# Patient Record
Sex: FEMALE | Race: BLACK OR AFRICAN AMERICAN | NOT HISPANIC OR LATINO | URBAN - METROPOLITAN AREA
[De-identification: names, ages, dates, MRNs, and addresses within clinical notes are randomized per-mention and may not be internally consistent; named-entity substitution may affect disease eponyms.]

---

## 2017-08-20 ENCOUNTER — EMERGENCY (EMERGENCY)
Facility: HOSPITAL | Age: 42
LOS: 1 days | Discharge: HOME | End: 2017-08-20
Attending: EMERGENCY MEDICINE | Admitting: EMERGENCY MEDICINE
Payer: MEDICAID

## 2017-08-20 VITALS
DIASTOLIC BLOOD PRESSURE: 83 MMHG | OXYGEN SATURATION: 99 % | WEIGHT: 255.74 LBS | RESPIRATION RATE: 18 BRPM | HEART RATE: 76 BPM | TEMPERATURE: 98 F | SYSTOLIC BLOOD PRESSURE: 150 MMHG | HEIGHT: 66 IN

## 2017-08-20 VITALS
RESPIRATION RATE: 18 BRPM | SYSTOLIC BLOOD PRESSURE: 121 MMHG | TEMPERATURE: 98 F | OXYGEN SATURATION: 100 % | DIASTOLIC BLOOD PRESSURE: 76 MMHG | HEART RATE: 62 BPM

## 2017-08-20 LAB
ALBUMIN SERPL ELPH-MCNC: 4.4 G/DL — SIGNIFICANT CHANGE UP (ref 3.3–5)
ALP SERPL-CCNC: 64 U/L — SIGNIFICANT CHANGE UP (ref 40–120)
ALT FLD-CCNC: 24 U/L — SIGNIFICANT CHANGE UP (ref 10–45)
ANION GAP SERPL CALC-SCNC: 13 MMOL/L — SIGNIFICANT CHANGE UP (ref 5–17)
APTT BLD: 26.6 SEC — LOW (ref 27.5–37.4)
AST SERPL-CCNC: 27 U/L — SIGNIFICANT CHANGE UP (ref 10–40)
BASOPHILS NFR BLD AUTO: 0.1 % — SIGNIFICANT CHANGE UP (ref 0–2)
BILIRUB SERPL-MCNC: 1.1 MG/DL — SIGNIFICANT CHANGE UP (ref 0.2–1.2)
BUN SERPL-MCNC: 8 MG/DL — SIGNIFICANT CHANGE UP (ref 7–23)
CALCIUM SERPL-MCNC: 9.3 MG/DL — SIGNIFICANT CHANGE UP (ref 8.4–10.5)
CHLORIDE SERPL-SCNC: 101 MMOL/L — SIGNIFICANT CHANGE UP (ref 96–108)
CK MB CFR SERPL CALC: 4.1 NG/ML — SIGNIFICANT CHANGE UP (ref 0–6.7)
CK SERPL-CCNC: 685 U/L — HIGH (ref 25–170)
CO2 SERPL-SCNC: 26 MMOL/L — SIGNIFICANT CHANGE UP (ref 22–31)
CREAT SERPL-MCNC: 0.8 MG/DL — SIGNIFICANT CHANGE UP (ref 0.5–1.3)
EOSINOPHIL NFR BLD AUTO: 0.2 % — SIGNIFICANT CHANGE UP (ref 0–6)
GLUCOSE SERPL-MCNC: 113 MG/DL — HIGH (ref 70–99)
HCT VFR BLD CALC: 30.6 % — LOW (ref 34.5–45)
HGB BLD-MCNC: 9 G/DL — LOW (ref 11.5–15.5)
INR BLD: 1.1 — SIGNIFICANT CHANGE UP (ref 0.88–1.16)
LIDOCAIN IGE QN: 14 U/L — SIGNIFICANT CHANGE UP (ref 7–60)
LYMPHOCYTES # BLD AUTO: 19.8 % — SIGNIFICANT CHANGE UP (ref 13–44)
MCHC RBC-ENTMCNC: 20.4 PG — LOW (ref 27–34)
MCHC RBC-ENTMCNC: 29.4 G/DL — LOW (ref 32–36)
MCV RBC AUTO: 69.4 FL — LOW (ref 80–100)
MONOCYTES NFR BLD AUTO: 5.4 % — SIGNIFICANT CHANGE UP (ref 2–14)
NEUTROPHILS NFR BLD AUTO: 74.5 % — SIGNIFICANT CHANGE UP (ref 43–77)
PLATELET # BLD AUTO: 217 K/UL — SIGNIFICANT CHANGE UP (ref 150–400)
POTASSIUM SERPL-MCNC: 3.8 MMOL/L — SIGNIFICANT CHANGE UP (ref 3.5–5.3)
POTASSIUM SERPL-SCNC: 3.8 MMOL/L — SIGNIFICANT CHANGE UP (ref 3.5–5.3)
PROT SERPL-MCNC: 8.1 G/DL — SIGNIFICANT CHANGE UP (ref 6–8.3)
PROTHROM AB SERPL-ACNC: 12.2 SEC — SIGNIFICANT CHANGE UP (ref 9.8–12.7)
RBC # BLD: 4.41 M/UL — SIGNIFICANT CHANGE UP (ref 3.8–5.2)
RBC # FLD: 17.4 % — HIGH (ref 10.3–16.9)
SODIUM SERPL-SCNC: 140 MMOL/L — SIGNIFICANT CHANGE UP (ref 135–145)
TROPONIN T SERPL-MCNC: <0.01 NG/ML — SIGNIFICANT CHANGE UP (ref 0–0.01)
WBC # BLD: 9.2 K/UL — SIGNIFICANT CHANGE UP (ref 3.8–10.5)
WBC # FLD AUTO: 9.2 K/UL — SIGNIFICANT CHANGE UP (ref 3.8–10.5)

## 2017-08-20 PROCEDURE — 93005 ELECTROCARDIOGRAM TRACING: CPT

## 2017-08-20 PROCEDURE — 85730 THROMBOPLASTIN TIME PARTIAL: CPT

## 2017-08-20 PROCEDURE — 96374 THER/PROPH/DIAG INJ IV PUSH: CPT

## 2017-08-20 PROCEDURE — 80053 COMPREHEN METABOLIC PANEL: CPT

## 2017-08-20 PROCEDURE — 85610 PROTHROMBIN TIME: CPT

## 2017-08-20 PROCEDURE — 83690 ASSAY OF LIPASE: CPT

## 2017-08-20 PROCEDURE — 85025 COMPLETE CBC W/AUTO DIFF WBC: CPT

## 2017-08-20 PROCEDURE — 96375 TX/PRO/DX INJ NEW DRUG ADDON: CPT

## 2017-08-20 PROCEDURE — 93010 ELECTROCARDIOGRAM REPORT: CPT

## 2017-08-20 PROCEDURE — 71010: CPT | Mod: 26

## 2017-08-20 PROCEDURE — 71045 X-RAY EXAM CHEST 1 VIEW: CPT

## 2017-08-20 PROCEDURE — 84484 ASSAY OF TROPONIN QUANT: CPT

## 2017-08-20 PROCEDURE — 36415 COLL VENOUS BLD VENIPUNCTURE: CPT

## 2017-08-20 PROCEDURE — 82553 CREATINE MB FRACTION: CPT

## 2017-08-20 PROCEDURE — 82550 ASSAY OF CK (CPK): CPT

## 2017-08-20 PROCEDURE — 99284 EMERGENCY DEPT VISIT MOD MDM: CPT | Mod: 25

## 2017-08-20 PROCEDURE — 99285 EMERGENCY DEPT VISIT HI MDM: CPT | Mod: 25

## 2017-08-20 RX ORDER — FAMOTIDINE 10 MG/ML
20 INJECTION INTRAVENOUS ONCE
Qty: 0 | Refills: 0 | Status: COMPLETED | OUTPATIENT
Start: 2017-08-20 | End: 2017-08-20

## 2017-08-20 RX ORDER — ONDANSETRON 8 MG/1
4 TABLET, FILM COATED ORAL ONCE
Qty: 0 | Refills: 0 | Status: COMPLETED | OUTPATIENT
Start: 2017-08-20 | End: 2017-08-20

## 2017-08-20 RX ORDER — OMEPRAZOLE 10 MG/1
1 CAPSULE, DELAYED RELEASE ORAL
Qty: 7 | Refills: 0 | OUTPATIENT
Start: 2017-08-20 | End: 2017-08-27

## 2017-08-20 RX ADMIN — FAMOTIDINE 20 MILLIGRAM(S): 10 INJECTION INTRAVENOUS at 06:25

## 2017-08-20 RX ADMIN — ONDANSETRON 4 MILLIGRAM(S): 8 TABLET, FILM COATED ORAL at 06:25

## 2017-08-20 NOTE — ED ADULT NURSE NOTE - OBJECTIVE STATEMENT
Patient to the ED for mid sternal chest discomfort occurring on and off for the past couple months.  Denies pain, refer to issue as discomfort associated with nausea and vomiting beginning yesterday, reports taking pepto bismol times 4 with no relief.

## 2017-08-20 NOTE — ED ADULT TRIAGE NOTE - ARRIVAL INFO ADDITIONAL COMMENTS
pt c.o midsternal chest pain with intermittent nausea and vomiting for 2 days. denies any fever, chills, diarrhea, difficulty in breathing, sob. c.o intermittent palpitations. equal and b.l chest rises with unlabored breathing noted.

## 2017-08-20 NOTE — ED PROVIDER NOTE - MEDICAL DECISION MAKING DETAILS
41F with intermittent dull SS chest pain now with epigastric discomfort and vomits after eating sausage sandwich from [employer]. EKG no stemi. Will check labs including CE. Also suspect pt has gastritis/food poisoning which is causing epigastric discomfort and vomits. Will give IVFs, zofran, and pepcid. Dispo pending w/u. Consider DC if labwork and CXR unremarkable and if patient's sx improved. Pt advised that if DC'd I recommend prompt outpt PMD/Cards appt for outpt stress. 41F with intermittent dull SS chest pain now with epigastric discomfort and vomits after eating sausage sandwich from [employer]. EKG no stemi. Will check labs including CE. Also suspect pt has gastritis/food poisoning which is causing epigastric discomfort and vomits. Will give IVFs, zofran, and pepcid. Dispo pending w/u. Consider DC if labwork and CXR unremarkable and if patient's sx improved. Pt advised that if DC'd I recommend prompt outpt PMD/Cards appt for outpt stress.  Patient feeling better. Labs neg trop and mild elevated CPK.  Recommend f/u with medicine and cards

## 2017-08-20 NOTE — ED ADULT NURSE NOTE - CHPI ED SYMPTOMS NEG
no back pain/no fever/no chills/no diaphoresis/no shortness of breath/no dizziness/no cough/no chest pain/no syncope

## 2017-08-20 NOTE — ED PROVIDER NOTE - OBJECTIVE STATEMENT
41F with no sig PMHx who p/w substernal chest discomfort, dull, intermittent x months. No A/A factors, no radiation. She came to ER today because it has been associated with multiple episodes of NB vomits and epigastric abd discomfort after eating an egg and sausage sandwich from [employer] yesterday. No f/c, no diarrhea, no SOB. Pt did not take anything for her sx and currently does not have a pmd. No h/o CAd, no fhx of cad.

## 2017-08-20 NOTE — ED PROVIDER NOTE - PROGRESS NOTE DETAILS
Pt received from Dr. Arroyo at s/o; pt awaiting labs including cardiac enzymes, lipase, lft's. Pt to be re-assessed after tx. If feeling better and labs normal, pt to be dc'd to home as per night team. Will continue to monitor.

## 2017-08-20 NOTE — ED PROVIDER NOTE - PHYSICAL EXAMINATION
GEN: Well appearing, obese, awake, alert, oriented to person, place, time/situation and in no apparent distress.  ENT: Airway patent, Nasal mucosa clear. Mouth with normal mucosa.  EYES: Clear bilaterally.  RESPIRATORY: Breathing comfortably with normal RR.  CARDIAC: Regular rate and rhythm, no w/c/r.  ABDOMEN: Soft, nontender, +bowel sounds, no rebound, rigidity, or guarding.  MSK: Range of motion is not limited, no deformities noted.  NEURO: Alert and oriented, no focal deficits.  SKIN: Skin normal color for race, warm, dry and intact. No evidence of rash.  PSYCH: Alert and oriented to person, place, time/situation. normal mood and affect. no apparent risk to self or others.

## 2017-08-24 DIAGNOSIS — R07.89 OTHER CHEST PAIN: ICD-10-CM

## 2017-08-24 DIAGNOSIS — K29.70 GASTRITIS, UNSPECIFIED, WITHOUT BLEEDING: ICD-10-CM

## 2017-08-24 PROBLEM — Z00.00 ENCOUNTER FOR PREVENTIVE HEALTH EXAMINATION: Status: ACTIVE | Noted: 2017-08-24

## 2017-09-29 ENCOUNTER — APPOINTMENT (OUTPATIENT)
Dept: HEART AND VASCULAR | Facility: CLINIC | Age: 42
End: 2017-09-29
Payer: MEDICAID

## 2017-09-29 VITALS
HEART RATE: 76 BPM | SYSTOLIC BLOOD PRESSURE: 122 MMHG | TEMPERATURE: 98.7 F | BODY MASS INDEX: 42.47 KG/M2 | WEIGHT: 258 LBS | HEIGHT: 65.5 IN | OXYGEN SATURATION: 99 % | DIASTOLIC BLOOD PRESSURE: 62 MMHG

## 2017-09-29 DIAGNOSIS — R00.2 PALPITATIONS: ICD-10-CM

## 2017-09-29 DIAGNOSIS — Z78.9 OTHER SPECIFIED HEALTH STATUS: ICD-10-CM

## 2017-09-29 DIAGNOSIS — Z86.2 PERSONAL HISTORY OF DISEASES OF THE BLOOD AND BLOOD-FORMING ORGANS AND CERTAIN DISORDERS INVOLVING THE IMMUNE MECHANISM: ICD-10-CM

## 2017-09-29 DIAGNOSIS — Z87.891 PERSONAL HISTORY OF NICOTINE DEPENDENCE: ICD-10-CM

## 2017-09-29 DIAGNOSIS — R73.09 OTHER ABNORMAL GLUCOSE: ICD-10-CM

## 2017-09-29 PROCEDURE — 99203 OFFICE O/P NEW LOW 30 MIN: CPT | Mod: 25

## 2017-09-29 PROCEDURE — 93000 ELECTROCARDIOGRAM COMPLETE: CPT

## 2017-09-29 PROCEDURE — 36415 COLL VENOUS BLD VENIPUNCTURE: CPT

## 2017-10-02 LAB
FERRITIN SERPL-MCNC: 6 NG/ML
HBA1C MFR BLD HPLC: 5.8 %
IRON SATN MFR SERPL: 5 %
IRON SERPL-MCNC: 20 UG/DL
TIBC SERPL-MCNC: 414 UG/DL
UIBC SERPL-MCNC: 394 UG/DL

## 2017-10-09 ENCOUNTER — APPOINTMENT (OUTPATIENT)
Dept: HEART AND VASCULAR | Facility: CLINIC | Age: 42
End: 2017-10-09
Payer: MEDICAID

## 2017-10-09 PROCEDURE — ZZZZZ: CPT

## 2017-10-09 PROCEDURE — 93015 CV STRESS TEST SUPVJ I&R: CPT

## 2017-10-09 PROCEDURE — 93306 TTE W/DOPPLER COMPLETE: CPT

## 2018-03-12 ENCOUNTER — APPOINTMENT (OUTPATIENT)
Dept: PULMONOLOGY | Facility: CLINIC | Age: 43
End: 2018-03-12
Payer: MEDICAID

## 2018-03-12 PROCEDURE — 94727 GAS DIL/WSHOT DETER LNG VOL: CPT

## 2018-03-12 PROCEDURE — 94060 EVALUATION OF WHEEZING: CPT

## 2018-03-12 PROCEDURE — 94729 DIFFUSING CAPACITY: CPT

## 2018-05-11 VITALS
DIASTOLIC BLOOD PRESSURE: 86 MMHG | TEMPERATURE: 98 F | OXYGEN SATURATION: 100 % | WEIGHT: 225.09 LBS | HEART RATE: 61 BPM | RESPIRATION RATE: 18 BRPM | HEIGHT: 66 IN | SYSTOLIC BLOOD PRESSURE: 147 MMHG

## 2018-05-11 PROCEDURE — 93010 ELECTROCARDIOGRAM REPORT: CPT

## 2018-05-11 PROCEDURE — 99285 EMERGENCY DEPT VISIT HI MDM: CPT | Mod: 25

## 2018-05-11 PROCEDURE — 99053 MED SERV 10PM-8AM 24 HR FAC: CPT

## 2018-05-11 RX ORDER — KETOROLAC TROMETHAMINE 30 MG/ML
30 SYRINGE (ML) INJECTION ONCE
Qty: 0 | Refills: 0 | Status: DISCONTINUED | OUTPATIENT
Start: 2018-05-11 | End: 2018-05-11

## 2018-05-11 NOTE — ED ADULT NURSE NOTE - OBJECTIVE STATEMENT
Pt walked into ED with c/o of right knee swelling and 6/10 pain with walking. Pt states onset was a week ago that she noted right after she started working out again. She also states that after the swelling she was on a flight to [place] today. She denies taking blood thinners, hx of DVT, palpitations, calf pain, CP, SOB, cough, fever, chills, taking Birth control or smoking. Pt denies taking any pain medication for relief. She is walking with a steady gait. Denies fall or trauma to the knee with no deformity noted.

## 2018-05-11 NOTE — ED ADULT NURSE NOTE - CHPI ED SYMPTOMS NEG
no weakness/no bruising/no stiffness/no tingling/no fever/no numbness/no difficulty bearing weight/no deformity/no abrasion/no back pain

## 2018-05-11 NOTE — ED ADULT TRIAGE NOTE - CHIEF COMPLAINT QUOTE
Pt presents with c/o RLE 8/10 PN, SWELLING X 1 WEEK. Recent travel to [place] but sx antecedent to travel.

## 2018-05-12 ENCOUNTER — TRANSCRIPTION ENCOUNTER (OUTPATIENT)
Age: 43
End: 2018-05-12

## 2018-05-12 ENCOUNTER — INPATIENT (INPATIENT)
Facility: HOSPITAL | Age: 43
LOS: 0 days | Discharge: HOME | DRG: 558 | End: 2018-05-12
Attending: INTERNAL MEDICINE | Admitting: INTERNAL MEDICINE
Payer: SELF-PAY

## 2018-05-12 VITALS
SYSTOLIC BLOOD PRESSURE: 128 MMHG | HEART RATE: 59 BPM | OXYGEN SATURATION: 100 % | DIASTOLIC BLOOD PRESSURE: 72 MMHG | TEMPERATURE: 98 F | RESPIRATION RATE: 18 BRPM

## 2018-05-12 DIAGNOSIS — D64.9 ANEMIA, UNSPECIFIED: ICD-10-CM

## 2018-05-12 DIAGNOSIS — Z29.9 ENCOUNTER FOR PROPHYLACTIC MEASURES, UNSPECIFIED: ICD-10-CM

## 2018-05-12 DIAGNOSIS — R63.8 OTHER SYMPTOMS AND SIGNS CONCERNING FOOD AND FLUID INTAKE: ICD-10-CM

## 2018-05-12 DIAGNOSIS — M62.82 RHABDOMYOLYSIS: ICD-10-CM

## 2018-05-12 LAB
ALBUMIN SERPL ELPH-MCNC: 3.4 G/DL — SIGNIFICANT CHANGE UP (ref 3.3–5)
ALBUMIN SERPL ELPH-MCNC: 3.6 G/DL — SIGNIFICANT CHANGE UP (ref 3.3–5)
ALBUMIN SERPL ELPH-MCNC: 4.2 G/DL — SIGNIFICANT CHANGE UP (ref 3.3–5)
ALP SERPL-CCNC: 53 U/L — SIGNIFICANT CHANGE UP (ref 40–120)
ALP SERPL-CCNC: 60 U/L — SIGNIFICANT CHANGE UP (ref 40–120)
ALP SERPL-CCNC: 61 U/L — SIGNIFICANT CHANGE UP (ref 40–120)
ALT FLD-CCNC: 38 U/L — SIGNIFICANT CHANGE UP (ref 10–45)
ALT FLD-CCNC: 41 U/L — SIGNIFICANT CHANGE UP (ref 10–45)
ALT FLD-CCNC: 47 U/L — HIGH (ref 10–45)
ANION GAP SERPL CALC-SCNC: 11 MMOL/L — SIGNIFICANT CHANGE UP (ref 5–17)
ANION GAP SERPL CALC-SCNC: 11 MMOL/L — SIGNIFICANT CHANGE UP (ref 5–17)
ANION GAP SERPL CALC-SCNC: 12 MMOL/L — SIGNIFICANT CHANGE UP (ref 5–17)
AST SERPL-CCNC: 63 U/L — HIGH (ref 10–40)
AST SERPL-CCNC: 65 U/L — HIGH (ref 10–40)
AST SERPL-CCNC: 95 U/L — HIGH (ref 10–40)
BASOPHILS NFR BLD AUTO: 0.3 % — SIGNIFICANT CHANGE UP (ref 0–2)
BILIRUB SERPL-MCNC: 0.5 MG/DL — SIGNIFICANT CHANGE UP (ref 0.2–1.2)
BILIRUB SERPL-MCNC: 0.6 MG/DL — SIGNIFICANT CHANGE UP (ref 0.2–1.2)
BILIRUB SERPL-MCNC: 0.7 MG/DL — SIGNIFICANT CHANGE UP (ref 0.2–1.2)
BLD GP AB SCN SERPL QL: NEGATIVE — SIGNIFICANT CHANGE UP
BUN SERPL-MCNC: 10 MG/DL — SIGNIFICANT CHANGE UP (ref 7–23)
BUN SERPL-MCNC: 13 MG/DL — SIGNIFICANT CHANGE UP (ref 7–23)
BUN SERPL-MCNC: 9 MG/DL — SIGNIFICANT CHANGE UP (ref 7–23)
CALCIUM SERPL-MCNC: 7.7 MG/DL — LOW (ref 8.4–10.5)
CALCIUM SERPL-MCNC: 8.2 MG/DL — LOW (ref 8.4–10.5)
CALCIUM SERPL-MCNC: 8.7 MG/DL — SIGNIFICANT CHANGE UP (ref 8.4–10.5)
CHLORIDE SERPL-SCNC: 101 MMOL/L — SIGNIFICANT CHANGE UP (ref 96–108)
CHLORIDE SERPL-SCNC: 104 MMOL/L — SIGNIFICANT CHANGE UP (ref 96–108)
CHLORIDE SERPL-SCNC: 106 MMOL/L — SIGNIFICANT CHANGE UP (ref 96–108)
CK SERPL-CCNC: 4264 U/L — HIGH (ref 25–170)
CK SERPL-CCNC: 4322 U/L — HIGH (ref 25–170)
CK SERPL-CCNC: 5702 U/L — HIGH (ref 25–170)
CO2 SERPL-SCNC: 22 MMOL/L — SIGNIFICANT CHANGE UP (ref 22–31)
CO2 SERPL-SCNC: 24 MMOL/L — SIGNIFICANT CHANGE UP (ref 22–31)
CO2 SERPL-SCNC: 27 MMOL/L — SIGNIFICANT CHANGE UP (ref 22–31)
CREAT SERPL-MCNC: 0.75 MG/DL — SIGNIFICANT CHANGE UP (ref 0.5–1.3)
CREAT SERPL-MCNC: 0.75 MG/DL — SIGNIFICANT CHANGE UP (ref 0.5–1.3)
CREAT SERPL-MCNC: 0.89 MG/DL — SIGNIFICANT CHANGE UP (ref 0.5–1.3)
EOSINOPHIL NFR BLD AUTO: 2.2 % — SIGNIFICANT CHANGE UP (ref 0–6)
FERRITIN SERPL-MCNC: 5 NG/ML — LOW (ref 15–150)
GLUCOSE SERPL-MCNC: 95 MG/DL — SIGNIFICANT CHANGE UP (ref 70–99)
GLUCOSE SERPL-MCNC: 97 MG/DL — SIGNIFICANT CHANGE UP (ref 70–99)
GLUCOSE SERPL-MCNC: 98 MG/DL — SIGNIFICANT CHANGE UP (ref 70–99)
HCT VFR BLD CALC: 29.8 % — LOW (ref 34.5–45)
HCT VFR BLD CALC: 32.9 % — LOW (ref 34.5–45)
HGB BLD-MCNC: 8.7 G/DL — LOW (ref 11.5–15.5)
HGB BLD-MCNC: 9.6 G/DL — LOW (ref 11.5–15.5)
IRON SATN MFR SERPL: 33 UG/DL — SIGNIFICANT CHANGE UP (ref 30–160)
IRON SATN MFR SERPL: 9 % — LOW (ref 14–50)
LYMPHOCYTES # BLD AUTO: 26.4 % — SIGNIFICANT CHANGE UP (ref 13–44)
MCHC RBC-ENTMCNC: 21.4 PG — LOW (ref 27–34)
MCHC RBC-ENTMCNC: 21.6 PG — LOW (ref 27–34)
MCHC RBC-ENTMCNC: 29.2 G/DL — LOW (ref 32–36)
MCHC RBC-ENTMCNC: 29.2 G/DL — LOW (ref 32–36)
MCV RBC AUTO: 73.4 FL — LOW (ref 80–100)
MCV RBC AUTO: 74.1 FL — LOW (ref 80–100)
MONOCYTES NFR BLD AUTO: 5.8 % — SIGNIFICANT CHANGE UP (ref 2–14)
NEUTROPHILS NFR BLD AUTO: 65.3 % — SIGNIFICANT CHANGE UP (ref 43–77)
PLATELET # BLD AUTO: 173 K/UL — SIGNIFICANT CHANGE UP (ref 150–400)
PLATELET # BLD AUTO: 182 K/UL — SIGNIFICANT CHANGE UP (ref 150–400)
POTASSIUM SERPL-MCNC: 3.6 MMOL/L — SIGNIFICANT CHANGE UP (ref 3.5–5.3)
POTASSIUM SERPL-MCNC: 4.1 MMOL/L — SIGNIFICANT CHANGE UP (ref 3.5–5.3)
POTASSIUM SERPL-MCNC: 4.7 MMOL/L — SIGNIFICANT CHANGE UP (ref 3.5–5.3)
POTASSIUM SERPL-SCNC: 3.6 MMOL/L — SIGNIFICANT CHANGE UP (ref 3.5–5.3)
POTASSIUM SERPL-SCNC: 4.1 MMOL/L — SIGNIFICANT CHANGE UP (ref 3.5–5.3)
POTASSIUM SERPL-SCNC: 4.7 MMOL/L — SIGNIFICANT CHANGE UP (ref 3.5–5.3)
PROT SERPL-MCNC: 6.3 G/DL — SIGNIFICANT CHANGE UP (ref 6–8.3)
PROT SERPL-MCNC: 6.4 G/DL — SIGNIFICANT CHANGE UP (ref 6–8.3)
PROT SERPL-MCNC: 7.7 G/DL — SIGNIFICANT CHANGE UP (ref 6–8.3)
RBC # BLD: 4.06 M/UL — SIGNIFICANT CHANGE UP (ref 3.8–5.2)
RBC # BLD: 4.44 M/UL — SIGNIFICANT CHANGE UP (ref 3.8–5.2)
RBC # FLD: 19.6 % — HIGH (ref 10.3–16.9)
RBC # FLD: 19.8 % — HIGH (ref 10.3–16.9)
RH IG SCN BLD-IMP: POSITIVE — SIGNIFICANT CHANGE UP
SODIUM SERPL-SCNC: 138 MMOL/L — SIGNIFICANT CHANGE UP (ref 135–145)
SODIUM SERPL-SCNC: 139 MMOL/L — SIGNIFICANT CHANGE UP (ref 135–145)
SODIUM SERPL-SCNC: 141 MMOL/L — SIGNIFICANT CHANGE UP (ref 135–145)
TIBC SERPL-MCNC: 355 UG/DL — SIGNIFICANT CHANGE UP (ref 220–430)
TRANSFERRIN SERPL-MCNC: 290 MG/DL — SIGNIFICANT CHANGE UP (ref 200–360)
UIBC SERPL-MCNC: 322 UG/DL — SIGNIFICANT CHANGE UP (ref 110–370)
WBC # BLD: 10.7 K/UL — HIGH (ref 3.8–10.5)
WBC # BLD: 6.7 K/UL — SIGNIFICANT CHANGE UP (ref 3.8–10.5)
WBC # FLD AUTO: 10.7 K/UL — HIGH (ref 3.8–10.5)
WBC # FLD AUTO: 6.7 K/UL — SIGNIFICANT CHANGE UP (ref 3.8–10.5)

## 2018-05-12 PROCEDURE — 93971 EXTREMITY STUDY: CPT

## 2018-05-12 PROCEDURE — 84702 CHORIONIC GONADOTROPIN TEST: CPT

## 2018-05-12 PROCEDURE — 83550 IRON BINDING TEST: CPT

## 2018-05-12 PROCEDURE — 93005 ELECTROCARDIOGRAM TRACING: CPT

## 2018-05-12 PROCEDURE — 82550 ASSAY OF CK (CPK): CPT

## 2018-05-12 PROCEDURE — 36415 COLL VENOUS BLD VENIPUNCTURE: CPT

## 2018-05-12 PROCEDURE — 85027 COMPLETE CBC AUTOMATED: CPT

## 2018-05-12 PROCEDURE — 93971 EXTREMITY STUDY: CPT | Mod: 26,RT

## 2018-05-12 PROCEDURE — 86900 BLOOD TYPING SEROLOGIC ABO: CPT

## 2018-05-12 PROCEDURE — 85025 COMPLETE CBC W/AUTO DIFF WBC: CPT

## 2018-05-12 PROCEDURE — 80053 COMPREHEN METABOLIC PANEL: CPT

## 2018-05-12 PROCEDURE — 86850 RBC ANTIBODY SCREEN: CPT

## 2018-05-12 PROCEDURE — 84466 ASSAY OF TRANSFERRIN: CPT

## 2018-05-12 PROCEDURE — 82728 ASSAY OF FERRITIN: CPT

## 2018-05-12 PROCEDURE — 99285 EMERGENCY DEPT VISIT HI MDM: CPT | Mod: 25

## 2018-05-12 PROCEDURE — 96374 THER/PROPH/DIAG INJ IV PUSH: CPT

## 2018-05-12 PROCEDURE — 86901 BLOOD TYPING SEROLOGIC RH(D): CPT

## 2018-05-12 RX ORDER — SODIUM CHLORIDE 9 MG/ML
1000 INJECTION INTRAMUSCULAR; INTRAVENOUS; SUBCUTANEOUS ONCE
Qty: 0 | Refills: 0 | Status: COMPLETED | OUTPATIENT
Start: 2018-05-12 | End: 2018-05-12

## 2018-05-12 RX ORDER — ASCORBIC ACID 60 MG
250 TABLET,CHEWABLE ORAL DAILY
Qty: 0 | Refills: 0 | Status: DISCONTINUED | OUTPATIENT
Start: 2018-05-12 | End: 2018-05-12

## 2018-05-12 RX ORDER — HEPARIN SODIUM 5000 [USP'U]/ML
7500 INJECTION INTRAVENOUS; SUBCUTANEOUS EVERY 8 HOURS
Qty: 0 | Refills: 0 | Status: DISCONTINUED | OUTPATIENT
Start: 2018-05-12 | End: 2018-05-12

## 2018-05-12 RX ORDER — ASCORBIC ACID 60 MG
1 TABLET,CHEWABLE ORAL
Qty: 30 | Refills: 0 | OUTPATIENT
Start: 2018-05-12 | End: 2018-06-10

## 2018-05-12 RX ORDER — FERROUS SULFATE 325(65) MG
1 TABLET ORAL
Qty: 0 | Refills: 0 | COMMUNITY

## 2018-05-12 RX ORDER — FERROUS SULFATE 325(65) MG
325 TABLET ORAL
Qty: 0 | Refills: 0 | Status: DISCONTINUED | OUTPATIENT
Start: 2018-05-12 | End: 2018-05-12

## 2018-05-12 RX ORDER — HEPARIN SODIUM 5000 [USP'U]/ML
5000 INJECTION INTRAVENOUS; SUBCUTANEOUS EVERY 8 HOURS
Qty: 0 | Refills: 0 | Status: DISCONTINUED | OUTPATIENT
Start: 2018-05-12 | End: 2018-05-12

## 2018-05-12 RX ORDER — ACETAMINOPHEN 500 MG
650 TABLET ORAL EVERY 6 HOURS
Qty: 0 | Refills: 0 | Status: DISCONTINUED | OUTPATIENT
Start: 2018-05-12 | End: 2018-05-12

## 2018-05-12 RX ORDER — SODIUM CHLORIDE 9 MG/ML
1000 INJECTION INTRAMUSCULAR; INTRAVENOUS; SUBCUTANEOUS
Qty: 0 | Refills: 0 | Status: DISCONTINUED | OUTPATIENT
Start: 2018-05-12 | End: 2018-05-12

## 2018-05-12 RX ADMIN — SODIUM CHLORIDE 1000 MILLILITER(S): 9 INJECTION INTRAMUSCULAR; INTRAVENOUS; SUBCUTANEOUS at 01:18

## 2018-05-12 RX ADMIN — Medication 30 MILLIGRAM(S): at 06:57

## 2018-05-12 RX ADMIN — Medication 30 MILLIGRAM(S): at 01:49

## 2018-05-12 RX ADMIN — SODIUM CHLORIDE 250 MILLILITER(S): 9 INJECTION INTRAMUSCULAR; INTRAVENOUS; SUBCUTANEOUS at 06:57

## 2018-05-12 RX ADMIN — SODIUM CHLORIDE 1000 MILLILITER(S): 9 INJECTION INTRAMUSCULAR; INTRAVENOUS; SUBCUTANEOUS at 03:07

## 2018-05-12 RX ADMIN — Medication 325 MILLIGRAM(S): at 18:24

## 2018-05-12 NOTE — DISCHARGE NOTE ADULT - PATIENT PORTAL LINK FT
You can access the [facility], offered by [facility], by registering with the following website: [email or web address]

## 2018-05-12 NOTE — ED PROVIDER NOTE - OBJECTIVE STATEMENT
42F no PMH c/o RLE pain. pt states pain ongoing for a few days.  +swelling to R thigh, pain to R distal thigh.  no fevers.  states recently started working out.  no calf pain. no rash.  states travelled back and forth from [place] today.

## 2018-05-12 NOTE — DISCHARGE NOTE ADULT - CARE PLAN
Principal Discharge DX:	Non-traumatic rhabdomyolysis  Goal:	Continue Adequate Fluid Hydration  Assessment and plan of treatment:	You were admitted for Rhabdomyolysis, which is likely related to Initiation of Exercise which Caused Muscle Breakdown, and Discomfort and Swelling in Right Leg. This was Diagnosed by an Elevated CK Level in your Blood. You were given IV Fluids and the CK Trended Down Appropriately and to a Safe Level. You are stable for Discharge Home. Please Continue Adequate Hydration, Drinking Plenty of Water for the Next Few Days.     If the pain in your Leg Worsens, or You Notice Changes in the Color of Your Urine (Becomes Dark), or Decreased Amount of Urine, You Should Return to the Emergency Room.     Please Follow-Up with your PCP in the Next 1 Week for Repeat Blood Work.  Secondary Diagnosis:	Anemia  Assessment and plan of treatment:	Your Hemoglobin was Low Ranging Between 8.7 and 9.6 on Admission. Labs Consistent with Iron Deficiency Anemia.     Please continue taking Iron 325mg One Tablet Twice Daily. Please start Taking Vitamin C 250mg One Tablet Daily to Help with Absorption. Prescription Sent To Your Pharmacy.     Please Follow-Up with your PCP Regarding Anemia as Well in Next 1 Week.

## 2018-05-12 NOTE — H&P ADULT - PROBLEM SELECTOR PLAN 2
Hemoglobin 9.6 on Admission, 8.7 After 3L Normal Saline and 250mL/Hour Since AM. Likely Dilutional in Setting of IV Fluids, and History of Iron Deficiency Anemia.   -Will Monitor.   -Keep Active Type and Screen.   -US As Above For DVT R/O, but Evaluate Soft Tissue and R/O Hematoma. No Signs of Ecchymoses on Examination.

## 2018-05-12 NOTE — DISCHARGE NOTE ADULT - PLAN OF CARE
Continue Adequate Fluid Hydration You were admitted for Rhabdomyolysis, which is likely related to Initiation of Exercise which Caused Muscle Breakdown, and Discomfort and Swelling in Right Leg. This was Diagnosed by an Elevated CK Level in your Blood. You were given IV Fluids and the CK Trended Down Appropriately and to a Safe Level. You are stable for Discharge Home. Please Continue Adequate Hydration, Drinking Plenty of Water for the Next Few Days.     If the pain in your Leg Worsens, or You Notice Changes in the Color of Your Urine (Becomes Dark), or Decreased Amount of Urine, You Should Return to the Emergency Room.     Please Follow-Up with your PCP in the Next 1 Week for Repeat Blood Work. Your Hemoglobin was Low Ranging Between 8.7 and 9.6 on Admission. Labs Consistent with Iron Deficiency Anemia.     Please continue taking Iron 325mg One Tablet Twice Daily. Please start Taking Vitamin C 250mg One Tablet Daily to Help with Absorption. Prescription Sent To Your Pharmacy.     Please Follow-Up with your PCP Regarding Anemia as Well in Next 1 Week.

## 2018-05-12 NOTE — H&P ADULT - ASSESSMENT
Patient is a 42 year old female with past medical history of Anemia (Newly Diagnosed, Started on Iron Supplementation Outpatient), presenting to [facility] with Discomfort in Right Quadriceps after Recently Starting to Work-Out 2 Weeks Ago, Discovered To Have Elevated CK, Likely Rhabdomyolysis.

## 2018-05-12 NOTE — ED PROVIDER NOTE - MEDICAL DECISION MAKING DETAILS
R thigh pain, subjective swelling, compartments soft, no evidence of infection  -check labs, toradol, US

## 2018-05-12 NOTE — ED ADULT NURSE REASSESSMENT NOTE - NS ED NURSE REASSESS COMMENT FT1
PATIENT DISCHARGED HOME BY DR CARRILLO IV HL REMOVED MEDICATION AND DISCHARGE INSTRUCTION REVIEW   WITH PATIENT WITH POSITIVE UNDERSTANDING  INSTRUCTED PATIENT TO FOLLOW UP WITH PCP .PATIENT LEFT ERHO AAOX4 NO C/O.

## 2018-05-12 NOTE — H&P ADULT - NSHPPHYSICALEXAM_GEN_ALL_CORE
Vital Signs Last 24 Hrs  T(C): 36.6 (12 May 2018 07:06), Max: 36.8 (11 May 2018 23:09)  T(F): 97.8 (12 May 2018 07:06), Max: 98.2 (11 May 2018 23:09)  HR: 58 (12 May 2018 07:06) (58 - 61)  BP: 131/83 (12 May 2018 07:06) (131/83 - 147/86)  BP(mean): --  RR: 18 (12 May 2018 07:06) (18 - 18)  SpO2: 98% (12 May 2018 07:06) (98% - 100%)    General: Well Appearing, NAD  HEENT: NCAT, PERRLA B/L, EOMI B/L, MMM  Neck: Supple  Heart: Normal S1+S2, RRR, No M/R/G  Lungs: CTA B/L, No W/R/R  Abdomen: Soft, NT/ND, Normoactive Bowel Sounds  Extremities: + Edema Right Quadriceps, Superior/Lateral to Knee, Mild Tenderness To Palpation. No Ecchymoses Noted, No Erythema, No Warmth. Otherwise Warm, Well Perfused, 2+ Radial and DP Pulses Bilaterally, No Other Edema  Neurological: AAOx3, No Focal Motor or Sensory Deficits  Skin: Warm, Dry

## 2018-05-12 NOTE — DISCHARGE NOTE ADULT - HOSPITAL COURSE
Patient is a 42 year old female with past medical history of Anemia (Newly Diagnosed, Started on Iron Supplementation Outpatient), presenting to [facility] with Discomfort in Right Quadriceps in Setting of Recently Starting Exercising. In the ED, Vitals were TMax 98.2, HR 58-61, -147/83-86, RR 18, SpO2 % on RA. Labs Notable for WBC 10.7, Hemoglobin 9.6, AST/ALT 95/47, CK 5702. Patient Received 3L NS and Started on NS at 250mL/Hour, and Toradol 30mg IV x 1. Patient's CK Trended Down To 4322 after Fluids. Patient had US Duplex Venous Right Lower Extremity to Evaluate for DVT or Possible Hematoma. US No DVT. Patient Deemed Stable for Discharge Home, with Adequate Hydration and Close Follow-Up with PCP in Next 1 Week.
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
## 2018-05-12 NOTE — ED PROVIDER NOTE - MUSCULOSKELETAL MINIMAL EXAM
normal range of motion/atraumatic/no knee pain, able to SLR on R, no edema, mild TTP R distal anterior thigh, sensation intact, 2+DP

## 2018-05-12 NOTE — H&P ADULT - ATTENDING COMMENTS
Pt. seen and examined by me earlier today; I have read Dr. Garcia's H&P, I agree w/ his findings and plan of care as documented; CPK now < 5K; checking Dopplers, can d/c home after if negative; oral hydration encouraged; Pt. plans to see her PMD next week for repeat BMP, LFTs, and CPK

## 2018-05-12 NOTE — H&P ADULT - NSHPLABSRESULTS_GEN_ALL_CORE
CBC Full  -  ( 12 May 2018 12:38 )  WBC Count : 6.7 K/uL  Hemoglobin : 8.7 g/dL  Hematocrit : 29.8 %  Platelet Count - Automated : 173 K/uL  Mean Cell Volume : 73.4 fL  Mean Cell Hemoglobin : 21.4 pg  Mean Cell Hemoglobin Concentration : 29.2 g/dL  Auto Neutrophil # : x  Auto Lymphocyte # : x  Auto Monocyte # : x  Auto Eosinophil # : x  Auto Basophil # : x  Auto Neutrophil % : x  Auto Lymphocyte % : x  Auto Monocyte % : x  Auto Eosinophil % : x  Auto Basophil % : x    05-12    138  |  104  |  10  ----------------------------<  95  3.6   |  22  |  0.75    Ca    7.7<L>      12 May 2018 05:35    TPro  6.3  /  Alb  3.4  /  TBili  0.5  /  DBili  x   /  AST  63<H>  /  ALT  38  /  AlkPhos  53  05-12    CK 5702-->4264

## 2018-05-12 NOTE — ED PROVIDER NOTE - PROGRESS NOTE DETAILS
elevated CPK, c/w rhabdo - will hydrate and continue to monitor problem accessioning study by radiologist - states he was able to follow vessel down to popliteal without evidence of clot.  +soft tissue edema repeat cpk improving, however still >4000. will admit for continued iv hydration.  no evidence of compartment syndrome currently.

## 2018-05-12 NOTE — DISCHARGE NOTE ADULT - MEDICATION SUMMARY - MEDICATIONS TO TAKE
I will START or STAY ON the medications listed below when I get home from the hospital:    ferrous sulfate 325 mg (65 mg elemental iron) oral tablet  -- 1 tab(s) by mouth 2 times a day  -- Indication: For Iron deficiency anemia    ascorbic acid 250 mg oral tablet  -- 1 tab(s) by mouth once a day  -- Indication: For Iron deficiency anemia

## 2018-05-16 DIAGNOSIS — D50.9 IRON DEFICIENCY ANEMIA, UNSPECIFIED: ICD-10-CM

## 2018-05-16 DIAGNOSIS — M79.606 PAIN IN LEG, UNSPECIFIED: ICD-10-CM

## 2018-05-16 DIAGNOSIS — M62.82 RHABDOMYOLYSIS: ICD-10-CM

## 2019-01-02 ENCOUNTER — EMERGENCY (EMERGENCY)
Facility: HOSPITAL | Age: 44
LOS: 1 days | Discharge: HOME | End: 2019-01-02
Admitting: EMERGENCY MEDICINE
Payer: COMMERCIAL

## 2019-01-02 VITALS
DIASTOLIC BLOOD PRESSURE: 83 MMHG | SYSTOLIC BLOOD PRESSURE: 174 MMHG | RESPIRATION RATE: 18 BRPM | HEART RATE: 75 BPM | OXYGEN SATURATION: 99 % | TEMPERATURE: 98 F | WEIGHT: 229.94 LBS

## 2019-01-02 PROCEDURE — 99283 EMERGENCY DEPT VISIT LOW MDM: CPT | Mod: 25

## 2019-01-02 PROCEDURE — 71046 X-RAY EXAM CHEST 2 VIEWS: CPT

## 2019-01-02 PROCEDURE — 71046 X-RAY EXAM CHEST 2 VIEWS: CPT | Mod: 26

## 2019-01-02 PROCEDURE — 99285 EMERGENCY DEPT VISIT HI MDM: CPT | Mod: 25

## 2019-01-02 PROCEDURE — 93010 ELECTROCARDIOGRAM REPORT: CPT

## 2019-01-02 PROCEDURE — 94640 AIRWAY INHALATION TREATMENT: CPT

## 2019-01-02 PROCEDURE — 93005 ELECTROCARDIOGRAM TRACING: CPT

## 2019-01-02 RX ORDER — IPRATROPIUM/ALBUTEROL SULFATE 18-103MCG
3 AEROSOL WITH ADAPTER (GRAM) INHALATION ONCE
Qty: 0 | Refills: 0 | Status: COMPLETED | OUTPATIENT
Start: 2019-01-02 | End: 2019-01-02

## 2019-01-02 RX ADMIN — Medication 3 MILLILITER(S): at 18:51
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
## 2019-01-02 NOTE — ED PROVIDER NOTE - PHYSICAL EXAMINATION
CONSTITUTIONAL: WD,WN. NAD.    SKIN: Normal color and turgor. No rash.    HEAD: NC/AT.  EYES: Conjunctiva clear. EOMI. PERRL.    ENT: Airway patent, OP without erythema, tonsillar swelling or exudate; uvula midline without swelling. Nasal mucosa clear, no rhinorrhea.   RESPIRATORY:  Breathing non-labored. No retractions or accessory muscle use.  Lungs CTA bilat.  CARDIOVASCULAR:  RRR, S1S2. 2/6 FREDERICK at LSB.        GI:  Abdomen soft, nontender.    MSK: Neck supple with painless ROM.  No lower extremity edema or calf tenderness.  Neg Odalys.  No joint swelling or ROM limitation.  NEURO: Alert and oriented; CN II-XII grossly intact. Speech clear. 5/5 strength in all extremities.  Normal balance and gait.

## 2019-01-02 NOTE — ED PROVIDER NOTE - RADIOLOGY FINDINGS
lungs clear, no pneumothorax, no pneumomediastinum, no infiltrate or effusion.  normal heart size and mediastinum.  no subcutaneous emphysema.

## 2019-01-02 NOTE — ED PROVIDER NOTE - NSFOLLOWUPINSTRUCTIONS_ED_ALL_ED_FT
Follow up with your doctor in 2 days.    Mild heart murmur was noted; please discuss with your doctor.  Return to ER for any new/worsening symptoms.

## 2019-01-02 NOTE — ED PROVIDER NOTE - NS ED ROS FT
CONSTITUTIONAL: No fever, chills, or weakness  NEURO: No headache, no dizziness, no syncope; No focal weakness/tingling/numbness  EYES: No visual changes  ENT: HPI  PULM: No SOB or hemoptysis  CV: No chest pain or palpitations  GI: No abdominal pain, vomiting, or diarrhea  : No dysuria, hematuria, frequency  MSK: No neck pain or back pain, no joint pain  SKIN: no rash or unusual bruising
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
## 2019-01-02 NOTE — ED ADULT NURSE NOTE - CHPI ED NUR SYMPTOMS NEG
no edema/no shortness of breath/no wheezing/no fever/no body aches/no chills/no chest pain/no diaphoresis/no hemoptysis/no cough/no headache

## 2019-01-03 PROBLEM — D50.9 IRON DEFICIENCY ANEMIA, UNSPECIFIED: Chronic | Status: ACTIVE | Noted: 2018-05-12

## 2019-01-06 DIAGNOSIS — Z87.891 PERSONAL HISTORY OF NICOTINE DEPENDENCE: ICD-10-CM

## 2019-01-06 DIAGNOSIS — Z77.098 CONTACT WITH AND (SUSPECTED) EXPOSURE TO OTHER HAZARDOUS, CHIEFLY NONMEDICINAL, CHEMICALS: ICD-10-CM

## 2019-01-06 DIAGNOSIS — Z79.899 OTHER LONG TERM (CURRENT) DRUG THERAPY: ICD-10-CM

## 2019-08-11 ENCOUNTER — EMERGENCY (EMERGENCY)
Facility: HOSPITAL | Age: 44
LOS: 1 days | Discharge: HOME | End: 2019-08-11
Attending: EMERGENCY MEDICINE | Admitting: EMERGENCY MEDICINE
Payer: COMMERCIAL

## 2019-08-11 VITALS
RESPIRATION RATE: 16 BRPM | SYSTOLIC BLOOD PRESSURE: 160 MMHG | OXYGEN SATURATION: 98 % | DIASTOLIC BLOOD PRESSURE: 76 MMHG | HEART RATE: 68 BPM | TEMPERATURE: 98 F

## 2019-08-11 VITALS
TEMPERATURE: 98 F | WEIGHT: 246.92 LBS | RESPIRATION RATE: 18 BRPM | SYSTOLIC BLOOD PRESSURE: 176 MMHG | HEIGHT: 66 IN | OXYGEN SATURATION: 100 % | DIASTOLIC BLOOD PRESSURE: 71 MMHG | HEART RATE: 55 BPM

## 2019-08-11 LAB
ALBUMIN SERPL ELPH-MCNC: 4.5 G/DL — SIGNIFICANT CHANGE UP (ref 3.3–5)
ALP SERPL-CCNC: 68 U/L — SIGNIFICANT CHANGE UP (ref 40–120)
ALT FLD-CCNC: 19 U/L — SIGNIFICANT CHANGE UP (ref 10–45)
ANION GAP SERPL CALC-SCNC: 10 MMOL/L — SIGNIFICANT CHANGE UP (ref 5–17)
ANISOCYTOSIS BLD QL: SLIGHT — SIGNIFICANT CHANGE UP
APPEARANCE UR: CLEAR — SIGNIFICANT CHANGE UP
AST SERPL-CCNC: 18 U/L — SIGNIFICANT CHANGE UP (ref 10–40)
BASOPHILS # BLD AUTO: 0 K/UL — SIGNIFICANT CHANGE UP (ref 0–0.2)
BASOPHILS NFR BLD AUTO: 0 % — SIGNIFICANT CHANGE UP (ref 0–2)
BILIRUB SERPL-MCNC: 0.9 MG/DL — SIGNIFICANT CHANGE UP (ref 0.2–1.2)
BILIRUB UR-MCNC: NEGATIVE — SIGNIFICANT CHANGE UP
BUN SERPL-MCNC: 14 MG/DL — SIGNIFICANT CHANGE UP (ref 7–23)
CALCIUM SERPL-MCNC: 8.9 MG/DL — SIGNIFICANT CHANGE UP (ref 8.4–10.5)
CHLORIDE SERPL-SCNC: 99 MMOL/L — SIGNIFICANT CHANGE UP (ref 96–108)
CO2 SERPL-SCNC: 27 MMOL/L — SIGNIFICANT CHANGE UP (ref 22–31)
COLOR SPEC: YELLOW — SIGNIFICANT CHANGE UP
CREAT SERPL-MCNC: 0.73 MG/DL — SIGNIFICANT CHANGE UP (ref 0.5–1.3)
DACRYOCYTES BLD QL SMEAR: SLIGHT — SIGNIFICANT CHANGE UP
DIFF PNL FLD: NEGATIVE — SIGNIFICANT CHANGE UP
EOSINOPHIL # BLD AUTO: 0.07 K/UL — SIGNIFICANT CHANGE UP (ref 0–0.5)
EOSINOPHIL NFR BLD AUTO: 1 % — SIGNIFICANT CHANGE UP (ref 0–6)
GLUCOSE SERPL-MCNC: 120 MG/DL — HIGH (ref 70–99)
GLUCOSE UR QL: NEGATIVE — SIGNIFICANT CHANGE UP
HCG UR QL: NEGATIVE — SIGNIFICANT CHANGE UP
HCT VFR BLD CALC: 34 % — LOW (ref 34.5–45)
HGB BLD-MCNC: 9.2 G/DL — LOW (ref 11.5–15.5)
HYPOCHROMIA BLD QL: SIGNIFICANT CHANGE UP
KETONES UR-MCNC: NEGATIVE — SIGNIFICANT CHANGE UP
LEUKOCYTE ESTERASE UR-ACNC: NEGATIVE — SIGNIFICANT CHANGE UP
LG PLATELETS BLD QL AUTO: SLIGHT — SIGNIFICANT CHANGE UP
LIDOCAIN IGE QN: 15 U/L — SIGNIFICANT CHANGE UP (ref 7–60)
LYMPHOCYTES # BLD AUTO: 1.27 K/UL — SIGNIFICANT CHANGE UP (ref 1–3.3)
LYMPHOCYTES # BLD AUTO: 17 % — SIGNIFICANT CHANGE UP (ref 13–44)
MANUAL SMEAR VERIFICATION: SIGNIFICANT CHANGE UP
MCHC RBC-ENTMCNC: 20.3 PG — LOW (ref 27–34)
MCHC RBC-ENTMCNC: 27.1 GM/DL — LOW (ref 32–36)
MCV RBC AUTO: 74.9 FL — LOW (ref 80–100)
MICROCYTES BLD QL: SLIGHT — SIGNIFICANT CHANGE UP
MONOCYTES # BLD AUTO: 0.3 K/UL — SIGNIFICANT CHANGE UP (ref 0–0.9)
MONOCYTES NFR BLD AUTO: 4 % — SIGNIFICANT CHANGE UP (ref 2–14)
NEUTROPHILS # BLD AUTO: 5.84 K/UL — SIGNIFICANT CHANGE UP (ref 1.8–7.4)
NEUTROPHILS NFR BLD AUTO: 78 % — HIGH (ref 43–77)
NITRITE UR-MCNC: NEGATIVE — SIGNIFICANT CHANGE UP
OVALOCYTES BLD QL SMEAR: SIGNIFICANT CHANGE UP
PH UR: 7 — SIGNIFICANT CHANGE UP (ref 5–8)
PLAT MORPH BLD: ABNORMAL
PLATELET # BLD AUTO: 168 K/UL — SIGNIFICANT CHANGE UP (ref 150–400)
PLATELET CLUMP BLD QL SMEAR: SIGNIFICANT CHANGE UP
POLYCHROMASIA BLD QL SMEAR: SLIGHT — SIGNIFICANT CHANGE UP
POTASSIUM SERPL-MCNC: 4.4 MMOL/L — SIGNIFICANT CHANGE UP (ref 3.5–5.3)
POTASSIUM SERPL-SCNC: 4.4 MMOL/L — SIGNIFICANT CHANGE UP (ref 3.5–5.3)
PROT SERPL-MCNC: 7.5 G/DL — SIGNIFICANT CHANGE UP (ref 6–8.3)
PROT UR-MCNC: NEGATIVE MG/DL — SIGNIFICANT CHANGE UP
RBC # BLD: 4.54 M/UL — SIGNIFICANT CHANGE UP (ref 3.8–5.2)
RBC # FLD: 17.2 % — HIGH (ref 10.3–14.5)
RBC BLD AUTO: ABNORMAL
SODIUM SERPL-SCNC: 136 MMOL/L — SIGNIFICANT CHANGE UP (ref 135–145)
SP GR SPEC: 1.02 — SIGNIFICANT CHANGE UP (ref 1–1.03)
SPHEROCYTES BLD QL SMEAR: SLIGHT — SIGNIFICANT CHANGE UP
UROBILINOGEN FLD QL: 0.2 E.U./DL — SIGNIFICANT CHANGE UP
WBC # BLD: 7.49 K/UL — SIGNIFICANT CHANGE UP (ref 3.8–10.5)
WBC # FLD AUTO: 7.49 K/UL — SIGNIFICANT CHANGE UP (ref 3.8–10.5)

## 2019-08-11 PROCEDURE — 81003 URINALYSIS AUTO W/O SCOPE: CPT

## 2019-08-11 PROCEDURE — 99284 EMERGENCY DEPT VISIT MOD MDM: CPT

## 2019-08-11 PROCEDURE — 99284 EMERGENCY DEPT VISIT MOD MDM: CPT | Mod: 25

## 2019-08-11 PROCEDURE — 83690 ASSAY OF LIPASE: CPT

## 2019-08-11 PROCEDURE — 80053 COMPREHEN METABOLIC PANEL: CPT

## 2019-08-11 PROCEDURE — 96361 HYDRATE IV INFUSION ADD-ON: CPT

## 2019-08-11 PROCEDURE — 81025 URINE PREGNANCY TEST: CPT

## 2019-08-11 PROCEDURE — 36415 COLL VENOUS BLD VENIPUNCTURE: CPT

## 2019-08-11 PROCEDURE — 96374 THER/PROPH/DIAG INJ IV PUSH: CPT

## 2019-08-11 PROCEDURE — 85025 COMPLETE CBC W/AUTO DIFF WBC: CPT

## 2019-08-11 RX ORDER — MECLIZINE HCL 12.5 MG
1 TABLET ORAL
Qty: 14 | Refills: 0
Start: 2019-08-11 | End: 2019-08-17

## 2019-08-11 RX ORDER — ONDANSETRON 8 MG/1
4 TABLET, FILM COATED ORAL ONCE
Refills: 0 | Status: COMPLETED | OUTPATIENT
Start: 2019-08-11 | End: 2019-08-11

## 2019-08-11 RX ORDER — SODIUM CHLORIDE 9 MG/ML
1000 INJECTION INTRAMUSCULAR; INTRAVENOUS; SUBCUTANEOUS ONCE
Refills: 0 | Status: COMPLETED | OUTPATIENT
Start: 2019-08-11 | End: 2019-08-11

## 2019-08-11 RX ORDER — MECLIZINE HCL 12.5 MG
25 TABLET ORAL ONCE
Refills: 0 | Status: COMPLETED | OUTPATIENT
Start: 2019-08-11 | End: 2019-08-11

## 2019-08-11 RX ORDER — SODIUM CHLORIDE 9 MG/ML
1000 INJECTION INTRAMUSCULAR; INTRAVENOUS; SUBCUTANEOUS
Refills: 0 | Status: DISCONTINUED | OUTPATIENT
Start: 2019-08-11 | End: 2019-08-15

## 2019-08-11 RX ADMIN — Medication 25 MILLIGRAM(S): at 07:08

## 2019-08-11 RX ADMIN — SODIUM CHLORIDE 125 MILLILITER(S): 9 INJECTION INTRAMUSCULAR; INTRAVENOUS; SUBCUTANEOUS at 06:08

## 2019-08-11 RX ADMIN — SODIUM CHLORIDE 1000 MILLILITER(S): 9 INJECTION INTRAMUSCULAR; INTRAVENOUS; SUBCUTANEOUS at 06:56

## 2019-08-11 RX ADMIN — SODIUM CHLORIDE 1000 MILLILITER(S): 9 INJECTION INTRAMUSCULAR; INTRAVENOUS; SUBCUTANEOUS at 06:08

## 2019-08-11 RX ADMIN — ONDANSETRON 4 MILLIGRAM(S): 8 TABLET, FILM COATED ORAL at 06:08

## 2019-08-11 NOTE — ED PROVIDER NOTE - ATTENDING CONTRIBUTION TO CARE
42 yo female h/o anemia 2/2 menometrorrhagia c/o sudden onset of n/v/d x 3 w/o associated abd pain, fever, dysuria.  No sick contacts but works at [employer] at [employer].  No travel, suspicious foods.  No ha, change in vision/speech/gait, numbness or weakness in ext, cp.  Pt felt spinning when she bent over and stood back up.  Well appearing, nad, nc/at, lung cta, heart reg, abd soft, nt, ext no gross deformity, no gross neuro deficits   ? viral gi, ? food borne illness, less likely vertigo, no ttp to suggest appy, diverticulitis, no uti sx.  Labs c/w pt's anemia but no other abnl; pt starting to feel improved.  Pt signed out to Dr De La Rosa and MALLORY Cook at 7am pending reassessment and po challenge.

## 2019-08-11 NOTE — ED ADULT NURSE REASSESSMENT NOTE - NS ED NURSE REASSESS COMMENT FT1
Pt is ambulatory with steady gait. Ambulated several times to the bathroom.
Patient ambulated to restroom. No nausea reported. PO challenge initiated

## 2019-08-11 NOTE — ED ADULT NURSE NOTE - OBJECTIVE STATEMENT
Received a 43 year old female with a chief complaint of nausea, vomiting x3 and 2 episodes of reported diarrhea/loose stools onset last evening. Patient denies any fevers and chills. No reported PMHx.

## 2019-08-11 NOTE — ED PROVIDER NOTE - CLINICAL SUMMARY MEDICAL DECISION MAKING FREE TEXT BOX
44 yo female in the Er after few episodes of vomiting and diarrhea  started tonight, also c/o dizziness. Pt afebrile, in NAD, lungs- CTA, abd- benign, NTND. possibility of viral etiology discussed. pt also mentioned some spinning like sensations tonight will give a trial of Meclizine, re-evaluate after. Anticipate d/c home if symptoms improve.

## 2019-08-11 NOTE — ED PROVIDER NOTE - OBJECTIVE STATEMENT
42 yo female with h/o menometrorrhagia and anemia, in the ER c/o sudden onset of nausea, vomiting and diarrhea  tonight at work. Pt states she felt very lightheaded and dizzy after vomiting x 3. Denies fever or chills, denies blood in the stool, denies dysuria, hematuria, urgency or frequency of urination. LMP- few days ago.

## 2019-08-11 NOTE — ED PROVIDER NOTE - PROGRESS NOTE DETAILS
Received SO from Dr. Vance and MALLORY Blakely. Labs nml. Symptoms improved after fluids. Advised rest and follow-up with PMD. Estrella:  Received s/o. Feeling much better, steady unassisted gait, comfortable for dc, outpt pmd f/u.

## 2019-08-11 NOTE — ED ADULT NURSE NOTE - CHPI ED NUR SYMPTOMS NEG
no abdominal distension/no fever/no hematuria/no chills/no dysuria/no blood in stool/no burning urination

## 2019-08-15 DIAGNOSIS — R11.2 NAUSEA WITH VOMITING, UNSPECIFIED: ICD-10-CM

## 2019-08-15 DIAGNOSIS — Z79.899 OTHER LONG TERM (CURRENT) DRUG THERAPY: ICD-10-CM

## 2019-08-15 DIAGNOSIS — R42 DIZZINESS AND GIDDINESS: ICD-10-CM

## 2019-08-15 DIAGNOSIS — R19.7 DIARRHEA, UNSPECIFIED: ICD-10-CM

## 2021-03-25 NOTE — ED ADULT TRIAGE NOTE - PAIN: PRESENCE, MLM
complains of pain/discomfort Rhombic Flap Text: The defect edges were debeveled with a #15 scalpel blade.  Given the location of the defect and the proximity to free margins a rhombic flap was deemed most appropriate.  Using a sterile surgical marker, an appropriate rhombic flap was drawn incorporating the defect.    The area thus outlined was incised deep to adipose tissue with a #15 scalpel blade.  The skin margins were undermined to an appropriate distance in all directions utilizing iris scissors.

## 2022-04-19 NOTE — ED ADULT NURSE NOTE - CCCP TRG CHIEF CMPLNT
Subjective   Patient ID: Comfort is a 23 month old female who is accompanied by:mother and father    Well Child Assessment:  Interval problems do not include recent illness or recent injury.   Nutrition  Food source: Does eat some varieties of foods.   Elimination  (No concerns)   Behavioral  (No concerns, age appropriate behavior)   Sleep  There are no sleep problems.   Safety  Home is child-proofed? yes. There is an appropriate car seat in use.   Screening  Immunizations are up-to-date.   Social  The caregiver enjoys the child.       HPI  Additional concerns today: None     Review of Systems   Psychiatric/Behavioral: Negative for sleep disturbance.   All other systems reviewed and are negative.      Patient's medications, allergies, past medical, surgical, social and family histories were reviewed and updated as appropriate.    Objective   Vitals: Pulse 136   Temp 97.7 °F (36.5 °C) (Temporal)   Resp 30   Ht 36\" (91.4 cm)   Wt 14.2 kg (31 lb 3.2 oz)   HC 48.3 cm (19\")   BMI 16.93 kg/m²   BSA 0.59 m²   Growth parameters are noted and are appropriate for age.    Physical Exam  Vitals reviewed.   Constitutional:       General: She is active.      Appearance: She is well-developed.   HENT:      Head: Normocephalic and atraumatic.      Right Ear: Tympanic membrane and external ear normal.      Left Ear: Tympanic membrane and external ear normal.      Nose: Nose normal.      Mouth/Throat:      Mouth: Mucous membranes are moist.      Pharynx: Oropharynx is clear.      Neck: Normal range of motion and neck supple.   Eyes:      General: Red reflex is present bilaterally. Visual tracking is normal.   Cardiovascular:      Rate and Rhythm: Normal rate and regular rhythm.      Heart sounds: S1 normal and S2 normal. No murmur heard.  Pulmonary:      Effort: Pulmonary effort is normal.      Breath sounds: Normal breath sounds and air entry.   Abdominal:      General: Bowel sounds are normal.      Palpations: Abdomen is  chemical exposure soft. There is no hepatomegaly or mass.      Tenderness: There is no abdominal tenderness.   Genitourinary:     Comments: Normal female genitalia  Skin:     General: Skin is warm.      Findings: No rash.   Neurological:      Mental Status: She is alert and oriented for age.      Cranial Nerves: No cranial nerve deficit.      Sensory: No sensory deficit.          Assessment   Problem List Items Addressed This Visit     None      Visit Diagnoses     Encounter for routine child health examination without abnormal findings    -  Primary       See patient instructions for Anticipatory Guidance given today.     Screening tests    MCHAT-R Score: Low (0-2)  Primary water source has adequate fluoride: Yes  Developmental milestones were reviewed and were: normal based on age    Counseling  Nutrition/Weight Management:  Assessment and discussion of current Nutrition behaviors performed:  Yes  Assessment and discussion of current Physical Activity behaviors performed: Yes     Immunizations: per orders.  History of previous adverse reactions to immunizations? no  Immunizations given today: No    Follow-up for the 30 month well child visit, or sooner as needed.

## 2022-05-04 ENCOUNTER — APPOINTMENT (OUTPATIENT)
Dept: ORTHOPEDIC SURGERY | Facility: CLINIC | Age: 47
End: 2022-05-04

## 2022-07-14 ENCOUNTER — APPOINTMENT (OUTPATIENT)
Dept: ORTHOPEDIC SURGERY | Facility: CLINIC | Age: 47
End: 2022-07-14

## 2022-07-14 VITALS — WEIGHT: 225 LBS | RESPIRATION RATE: 16 BRPM | BODY MASS INDEX: 36.16 KG/M2 | HEIGHT: 66 IN

## 2022-07-14 DIAGNOSIS — R22.33 LOCALIZED SWELLING, MASS AND LUMP, UPPER LIMB, BILATERAL: ICD-10-CM

## 2022-07-14 PROCEDURE — 99204 OFFICE O/P NEW MOD 45 MIN: CPT | Mod: 25

## 2022-07-14 PROCEDURE — 20600 DRAIN/INJ JOINT/BURSA W/O US: CPT

## 2022-07-14 PROCEDURE — 73130 X-RAY EXAM OF HAND: CPT | Mod: 50

## 2023-10-19 ENCOUNTER — EMERGENCY (EMERGENCY)
Facility: HOSPITAL | Age: 48
LOS: 1 days | Discharge: HOME | End: 2023-10-19
Admitting: STUDENT IN AN ORGANIZED HEALTH CARE EDUCATION/TRAINING PROGRAM
Payer: COMMERCIAL

## 2023-10-19 VITALS
SYSTOLIC BLOOD PRESSURE: 151 MMHG | HEIGHT: 66 IN | TEMPERATURE: 98 F | WEIGHT: 270.07 LBS | RESPIRATION RATE: 18 BRPM | DIASTOLIC BLOOD PRESSURE: 89 MMHG | OXYGEN SATURATION: 97 % | HEART RATE: 64 BPM

## 2023-10-19 DIAGNOSIS — M79.604 PAIN IN RIGHT LEG: ICD-10-CM

## 2023-10-19 DIAGNOSIS — R60.0 LOCALIZED EDEMA: ICD-10-CM

## 2023-10-19 DIAGNOSIS — M25.461 EFFUSION, RIGHT KNEE: ICD-10-CM

## 2023-10-19 PROCEDURE — 99284 EMERGENCY DEPT VISIT MOD MDM: CPT

## 2023-10-19 PROCEDURE — 99053 MED SERV 10PM-8AM 24 HR FAC: CPT

## 2023-10-19 NOTE — ED ADULT NURSE NOTE - OBJECTIVE STATEMENT
Pt A&ox4 and able to speak in complete sentences. Pt breathing even and unlabored with equal chest rise and fall. Pt endorsed worsening right leg pain over the pass three days. pt denies tenderness to palpation. pt denies cp, n, v, lightheadedness, dizziness, sob, n, v, fevers, chills.

## 2023-10-19 NOTE — ED ADULT NURSE NOTE - CAS EDP DISCH TYPE
Chief Complaint   Patient presents with    Follow-up     Patient is here to get her ears cleaned. HPI:  Crys Varghese is a 74 y.o. female seen in follow-up for routine 6 mo ear cleaning- last seen on 1/20/23. She has very narrowed EACs bilaterally. Doing well since her last visit with no major change in hearing, and she denies any otalgia or otorrhea. No other new complaints today. Past Medical History, Past Surgical History, Family history, Social History, and Medications were all reviewed with the patient today and updated as necessary. Allergies   Allergen Reactions    Latex     Acetaminophen     Aspirin      Other reaction(s): Blood count problem-Allergy    Azithromycin     Cephalexin     Chocolate Flavor     Ibuprofen     Methadone     Mirabegron Other (See Comments)    Penicillins     Pentazocine     Propoxyphene Nausea Only and Rash    Sulfa Antibiotics Rash     Patient Active Problem List   Diagnosis    Vitamin D deficiency    Hypothyroidism     Current Outpatient Medications   Medication Sig    pravastatin (PRAVACHOL) 20 MG tablet     ASPIRIN 81 PO Take by mouth    Biotin 10 MG tablet Take by mouth    calcium citrate-vitamin D (CITRICAL + D) 315-250 MG-UNIT TABS per tablet Take by mouth 2 times daily    Cholecalciferol 50 MCG (2000 UT) TABS Take by mouth    docusate (COLACE, DULCOLAX) 100 MG CAPS Take 100 mg by mouth    ferrous gluconate (FERGON) 240 (27 Fe) MG tablet Take 1 tablet by mouth 3 times daily (with meals)    levothyroxine (SYNTHROID) 50 MCG tablet TAKE ONE TABLET BY MOUTH EVERY DAY     No current facility-administered medications for this visit.      Past Medical History:   Diagnosis Date    Anemia     IBS (irritable bowel syndrome)     Mixed conductive and sensorineural hearing loss     Unspecified hypothyroidism 2/26/2013    Unspecified vitamin D deficiency      Social History     Tobacco Use    Smoking status: Never    Smokeless tobacco: Never   Substance Use Home

## 2023-10-19 NOTE — ED ADULT NURSE NOTE - NSFALLUNIVINTERV_ED_ALL_ED
Bed/Stretcher in lowest position, wheels locked, appropriate side rails in place/Call bell, personal items and telephone in reach/Instruct patient to call for assistance before getting out of bed/chair/stretcher/Non-slip footwear applied when patient is off stretcher/[employer] to call system/Physically safe environment - no spills, clutter or unnecessary equipment/Purposeful proactive rounding/Room/bathroom lighting operational, light cord in reach

## 2023-10-19 NOTE — ED ADULT TRIAGE NOTE - CHIEF COMPLAINT QUOTE
Pt reports 3 days ago she woke with R leg/foot pain. Pt denies any recent travel, no swelling noted.

## 2023-10-20 VITALS
DIASTOLIC BLOOD PRESSURE: 78 MMHG | OXYGEN SATURATION: 98 % | SYSTOLIC BLOOD PRESSURE: 126 MMHG | HEART RATE: 58 BPM | RESPIRATION RATE: 16 BRPM | TEMPERATURE: 98 F

## 2023-10-20 PROCEDURE — 93971 EXTREMITY STUDY: CPT | Mod: 26,RT

## 2023-10-20 PROCEDURE — 99284 EMERGENCY DEPT VISIT MOD MDM: CPT | Mod: 25

## 2023-10-20 PROCEDURE — 93971 EXTREMITY STUDY: CPT

## 2023-10-20 RX ORDER — IBUPROFEN 200 MG
800 TABLET ORAL ONCE
Refills: 0 | Status: COMPLETED | OUTPATIENT
Start: 2023-10-20 | End: 2023-10-20

## 2023-10-20 RX ADMIN — Medication 800 MILLIGRAM(S): at 01:36

## 2023-10-20 RX ADMIN — Medication 800 MILLIGRAM(S): at 02:40

## 2023-10-20 NOTE — ED PROVIDER NOTE - PHYSICAL EXAMINATION
Constitutional: awake and alert, in no acute distress  HEENT: head normocephalic and atraumatic. moist mucous membranes  Eyes: extraocular movements intact, normal conjunctiva  Neck: supple, normal ROM  Cardiovascular: regular rate   Pulmonary: no respiratory distress, no wheezes, no rales,   Gastrointestinal: abdomen flat and nondistended, NT  Skin: warm, dry, normal for ethnicity  Musculoskeletal: no edema, no deformity, NROM to right knee, diffuse tenderness over the joint, no deformity, no erythema, no increased warmth  Neurological: oriented x4, no focal neurologic deficit.   Psychiatric: calm and cooperative, no SI/HI no

## 2023-10-20 NOTE — ED PROVIDER NOTE - CARE PLAN
Pt presents to the IC with c/o a fever since Sunday, and a scratchy sensation in her lungs. +cough with nasal congestion. Hx of emphysema. +sore throat that resolved on Monday. Pt reports chills and body aches with loss of appetite. Principal Discharge DX:	Leg pain  Secondary Diagnosis:	Knee effusion   1

## 2023-10-20 NOTE — ED PROVIDER NOTE - PROVIDER TOKENS
PROVIDER:[TOKEN:[[id number]]],PROVIDER:[TOKEN:[[id number]]],PROVIDER:[TOKEN:[[id number]]]

## 2023-10-20 NOTE — ED PROVIDER NOTE - OBJECTIVE STATEMENT
46 yo female in the Er c/o right leg pain for the past 3-4 days. Pt states pain is with ambulation and at rest. Pt mentioned she fell about 2 weeks ago, but had no pain to right leg right after her fall. Pt noted slight edema around her right ankle and concerned about possible blood clots. No prior h/o clots.

## 2023-10-20 NOTE — ED PROVIDER NOTE - PATIENT PORTAL LINK FT
You can access the FollowMyHealth Patient Portal offered by [facility] by registering at the following website: [email or web address]. By joining [employer]’s FollowMyHealth portal, you will also be able to view your health information using other applications (apps) compatible with our system.

## 2023-10-20 NOTE — ED PROVIDER NOTE - CARE PROVIDER_API CALL
Guicho Capps  Orthopaedic Surgery  [address]  [place] [address]  Phone: [phone]  Fax: [phone]  Follow Up Time:     Joni Crawford  Orthopaedic Surgery  [address] [place] [address]  Phone: [phone]  Fax: [phone]  Follow Up Time:     Scotty Phillips  Orthopaedic Surgery  [address] [facility], [address]  [place] [address]  Phone: [phone]  Fax: [phone]  Follow Up Time:

## 2023-10-20 NOTE — ED PROVIDER NOTE - NSFOLLOWUPINSTRUCTIONS_ED_ALL_ED_FT
Please follow up with your orthopedist  in a few days for re-evaluation. Elevate your legs at rest and take Ibuprofen for pain as recommended.    Leg Pain    WHAT YOU NEED TO KNOW:    Leg pain may be caused by a variety of health conditions. Your tests did not show any broken bones or blood clots.    DISCHARGE INSTRUCTIONS:    Return to the emergency department if:    You have a fever.    Your leg starts to swell.    Your leg pain gets worse.    You have numbness or tingling in your leg or toes.    You cannot put any weight on or move your leg.  Contact your healthcare provider if:    Your pain does not decrease, even after treatment.    You have questions or concerns about your condition or care.  Medicines:    NSAIDs, such as ibuprofen, help decrease swelling, pain, and fever. This medicine is available with or without a doctor's order. NSAIDs can cause stomach bleeding or kidney problems in certain people. If you take blood thinner medicine, always ask your healthcare provider if NSAIDs are safe for you. Always read the medicine label and follow directions.    Take your medicine as directed. Contact your healthcare provider if you think your medicine is not helping or if you have side effects. Tell your provider if you are allergic to any medicine. Keep a list of the medicines, vitamins, and herbs you take. Include the amounts, and when and why you take them. Bring the list or the pill bottles to follow-up visits. Carry your medicine list with you in case of an emergency.  Follow up with your healthcare provider as directed: You may need more tests to find the cause of your leg pain. You may need to see an orthopedic specialist or a physical therapist. Write down your questions so you remember to ask them during your visits.    Manage your leg pain:    Rest your injured leg so that it can heal. You may need an immobilizer, brace, or splint to limit the movement of your leg. You may need to avoid putting any weight on your leg for at least 48 hours. Return to normal activities as directed.    Ice the injury for 20 minutes every 4 hours for up to 24 hours, or as directed. Use an ice pack, or put crushed ice in a plastic bag. Cover it with a towel to protect your skin. Ice helps prevent tissue damage and decreases swelling and pain.    Elevate your injured leg above the level of your heart as often as you can. This will help decrease swelling and pain. If possible, prop your leg on pillows or blankets to keep the area elevated comfortably.    Use assistive devices as directed. You may need to use a cane or crutches. Assistive devices help decrease pain and pressure on your leg when you walk. Ask your healthcare provider for more information about assistive devices and how to use them correctly.    Maintain a healthy weight. Extra body weight can cause pressure and pain in your hip, knee, and ankle joints. Ask your healthcare provider how much you should weigh. Ask him to help you create a weight loss plan if you are overweight.

## 2023-10-20 NOTE — ED PROVIDER NOTE - CLINICAL SUMMARY MEDICAL DECISION MAKING FREE TEXT BOX
46 yo female in the Er c/o right leg pain for the past 3-4 days. Pt states pain is with ambulation and at rest. Pt mentioned she fell about 2 weeks ago, but had no pain to right leg right after her fall. Pt noted slight edema around her right ankle and concerned about possible blood clots. No prior h/o clots.   Pt ambulatory , has no  RLE edema or discolorations, has  good pulses, has diffuse tenderness over right knee joint, NROM, but no deformity and no signs of infection, will check doppler, anticipate d/c home for further out pt f/u with ortho. arthritis? knee sprain? popliteal cyst? knee contusion?

## 2023-10-20 NOTE — ED PROVIDER NOTE - CARE PROVIDERS DIRECT ADDRESSES
,[email or web address],[email or web address],DirectAddress_Unknown

## 2023-10-26 ENCOUNTER — NON-APPOINTMENT (OUTPATIENT)
Age: 48
End: 2023-10-26

## 2023-12-31 ENCOUNTER — EMERGENCY (EMERGENCY)
Facility: HOSPITAL | Age: 48
LOS: 1 days | Discharge: HOME | End: 2023-12-31
Admitting: EMERGENCY MEDICINE
Payer: OTHER MISCELLANEOUS

## 2023-12-31 VITALS
HEART RATE: 65 BPM | OXYGEN SATURATION: 100 % | SYSTOLIC BLOOD PRESSURE: 166 MMHG | RESPIRATION RATE: 18 BRPM | TEMPERATURE: 98 F | DIASTOLIC BLOOD PRESSURE: 83 MMHG | WEIGHT: 270.07 LBS

## 2023-12-31 PROCEDURE — 99283 EMERGENCY DEPT VISIT LOW MDM: CPT

## 2023-12-31 PROCEDURE — 99284 EMERGENCY DEPT VISIT MOD MDM: CPT

## 2023-12-31 RX ORDER — METHOCARBAMOL 500 MG/1
2 TABLET, FILM COATED ORAL
Qty: 30 | Refills: 0
Start: 2023-12-31

## 2023-12-31 RX ORDER — METHOCARBAMOL 500 MG/1
750 TABLET, FILM COATED ORAL ONCE
Refills: 0 | Status: COMPLETED | OUTPATIENT
Start: 2023-12-31 | End: 2023-12-31

## 2023-12-31 RX ADMIN — METHOCARBAMOL 750 MILLIGRAM(S): 500 TABLET, FILM COATED ORAL at 13:54

## 2023-12-31 NOTE — ED ADULT NURSE NOTE - NSFALLUNIVINTERV_ED_ALL_ED
Bed/Stretcher in lowest position, wheels locked, appropriate side rails in place/Call bell, personal items and telephone in reach/Instruct patient to call for assistance before getting out of bed/chair/stretcher/Non-slip footwear applied when patient is off stretcher/[place] to call system/Physically safe environment - no spills, clutter or unnecessary equipment/Purposeful proactive rounding/Room/bathroom lighting operational, light cord in reach Bed/Stretcher in lowest position, wheels locked, appropriate side rails in place/Call bell, personal items and telephone in reach/Instruct patient to call for assistance before getting out of bed/chair/stretcher/Non-slip footwear applied when patient is off stretcher/[employer] to call system/Physically safe environment - no spills, clutter or unnecessary equipment/Purposeful proactive rounding/Room/bathroom lighting operational, light cord in reach

## 2023-12-31 NOTE — ED PROVIDER NOTE - PATIENT PORTAL LINK FT
You can access the FollowMyHealth Patient Portal offered by [facility] by registering at the following website: [email or web address]. By joining [employer]’s FollowMyHealth portal, you will also be able to view your health information using other applications (apps) compatible with our system. You can access the FollowMyHealth Patient Portal offered by [facility] by registering at the following website: [email or web address]. By joining [employer]’s FollowMyHealth portal, you will also be able to view your health information using other applications (apps) compatible with our system.

## 2023-12-31 NOTE — ED ADULT NURSE NOTE - OBJECTIVE STATEMENT
Pt A&ox4 and able to speak in complete sentences. pt breathing even and unlabored with equal chest rise and fall. pt arrived d/t right arm pain and third finger numbness over the past two weeks. pt pmh  injury in november.

## 2023-12-31 NOTE — ED PROVIDER NOTE - CARE PROVIDER_API CALL
Guicho Coronado  Orthopaedic Surgery  [address]  [place] [address]  Phone: [phone]  Fax: [phone]  Follow Up Time:     Guicho Capps  Orthopaedic Surgery  [address]  [place] [address]  Phone: [phone]  Fax: [phone]  Follow Up Time:    Guicho Coronado  Orthopaedic Surgery  [address]  [place] [address]  Phone: [phone]  Fax: [phone]  Follow Up Time:     Guicho Capps  Orthopaedic Surgery  [address]  [place] [address]  Phone: [phone]  Fax: [phone]  Follow Up Time:

## 2023-12-31 NOTE — ED PROVIDER NOTE - PHYSICAL EXAMINATION
Vitals reviewed  Gen: comfortable appearing at rest, in nad, speaking in full sentences  Skin: wwp, no rash/lesions  HEENT: ncat, eomi, mmm  Neck/Back: no midline ttp/step off, no paraspinal ttp, neg SLR, ROM not limited    CV: rrr, no audible m/r/g  Resp: symmetrical expansion, ctab, no w/r/r   RUE:  + pain illicited w/ movement R elbow/shoulder but no joint ttp or edema, radial/ulnar pulse 2+, SILT all nerve distribtuions reports diminished 2nd./3rd digit only, cap refill < 2 sec, no clubbing/cyanosis.  Neuro: alert/oriented, no focal deficits, steady gait without assistance

## 2023-12-31 NOTE — ED PROVIDER NOTE - CLINICAL SUMMARY MEDICAL DECISION MAKING FREE TEXT BOX
48 F p/w R arm pain and paresthesias since injury in Oct.  follows outpt w/ physical pain MD/ physical therapy but reports sxs worsening and cannot see ortho till Feb.  on exam vss, well appearing, no midline spinal ttp,  RUE:  + pain illicited w/ movement R elbow/shoulder but no joint ttp or edema, radial/ulnar pulse 2+, SILT all nerve distribtuions reports diminished 2nd./3rd digit only, cap refill < 2 sec, no clubbing/cyanosis.  suspect radiculopathy from previous injury.  will give robaxin and have continue ibuprofen.  refer to ortho at [facility] for possible earlier appt.  discussed strict return parameters 48 F p/w R arm pain and paresthesias since injury in Oct.  follows outpt w/ physical pain MD/ physical therapy but reports sxs worsening and cannot see ortho till Feb.  on exam vss, well appearing, no midline spinal ttp,  RUE:  + pain illicited w/ movement R elbow/shoulder but no joint ttp or edema, radial/ulnar pulse 2+, SILT all nerve distribtuions reports diminished 2nd./3rd digit only, cap refill < 2 sec, no clubbing/cyanosis.  suspect radiculopathy from previous injury.  will give robaxin and have continue ibuprofen.  refer to ortho at [facility] for possible earlier appt.  discussed strict return parameters

## 2023-12-31 NOTE — ED PROVIDER NOTE - NSFOLLOWUPINSTRUCTIONS_ED_ALL_ED_FT
Take tylenol 650mg or motrin 600mg for pain every 4-6 hours.  You can also take flexeril (muscle relaxer) as prescribed for pain but do not drive/operate heavy machinery as it can make you drowsy    [facility] (Thursday afternoons) - call [phone] to schedule    You may call our referrals coordinator at [phone] Monday to Friday 11am-7pm for assistance with making an appointment    Radicular Pain  Back view of a person showing a normal leg and a leg affected by the pain of sciatica.   Radicular pain is a type of pain that spreads from your back or neck along a spinal nerve. Spinal nerves are nerves that leave the spinal cord and go to the muscles. Radicular pain is sometimes called radiculopathy, radiculitis, or a pinched nerve. When you have this type of pain, you may also have weakness, numbness, or tingling in the area of your body that is supplied by the nerve. The pain may feel sharp and burning. Depending on which spinal nerve is affected, the pain may occur in the:  Neck area (cervical radicular pain). You may also feel pain, numbness, weakness, or tingling in the arms.  Mid-spine area (thoracic radicular pain). You would feel this pain in the back and chest. This type is rare.  Lower back area (lumbar radicular pain). You would feel this pain as low back pain. You may feel pain, numbness, weakness, or tingling in the buttocks or legs. Sciatica is a type of lumbar radicular pain that shoots down the back of the leg.  Radicular pain occurs when one of the spinal nerves becomes irritated or squeezed (compressed). It is often caused by something pushing on a spinal nerve, such as one of the bones of the spine (vertebrae) or one of the round cushions between vertebrae (intervertebral disks). This can result from:  An injury.  Wear and tear or aging of a disk.  The growth of a bone spur that pushes on the nerve.  Radicular pain often goes away when you follow instructions from your health care provider for relieving pain at home.    How is this treated?  Treatment may depend on the cause of the condition and may include:  Working with a physical therapist.  Taking pain medicine.  Applying heat or ice or both to the affected areas.  Doing stretches to improve flexibility.  Having surgery. This may be needed if other treatments do not help. Different types of surgery may be done depending on the cause of this condition.  Follow these instructions at home:  Managing pain    Bag of ice on a towel on the skin.  A heating pad for use on the painful area.  If directed, put ice on the affected area. To do this:  Put ice in a plastic bag.  Place a towel between your skin and the bag.  Leave the ice on for 20 minutes, 2–3 times a day.  Remove the ice if your skin turns bright red. This is very important. If you cannot feel pain, heat, or cold, you have a greater risk of damage to the area.  If directed, apply heat to the affected area as often as told by your health care provider. Use the heat source that your health care provider recommends, such as a moist heat pack or a heating pad.  Place a towel between your skin and the heat source.  Leave the heat on for 20–30 minutes.  Remove the heat if your skin turns bright red. This is especially important if you are unable to feel pain, heat, or cold. You have a greater risk of getting burned.  Activity    Do not sit or rest in bed for long periods of time.  Try to stay as active as possible. Ask your health care provider what type of exercise or activity is best for you.  Avoid activities that make your pain worse, such as bending and lifting.  You may have to avoid lifting. Ask your health care provider how much you can safely lift.  Practice using proper technique when lifting items. Proper lifting technique involves bending your knees and rising up.  Do strength and range-of-motion exercises only as told by your health care provider or physical therapist.  General instructions    Take over-the-counter and prescription medicines only as told by your health care provider.  Pay attention to any changes in your symptoms.  Keep all follow-up visits. This is important.  Contact a health care provider if:  Your pain and other symptoms get worse.  Your pain medicine is not helping.  Your pain has not improved after a few weeks of home care.  You have a fever.  Get help right away if:  You have severe pain, weakness, or numbness.  You have difficulty with bladder or bowel control.  Summary  Radicular pain is a type of pain that spreads from your back or neck along a spinal nerve.  When you have radicular pain, you may also have weakness, numbness, or tingling in the area of your body that is supplied by the nerve.  The pain may feel sharp or burning.  Radicular pain may be treated with ice, heat, medicines, or physical therapy.  This information is not intended to replace advice given to you by your health care provider. Make sure you discuss any questions you have with your health care provider. Take tylenol 650mg or motrin 600mg for pain every 4-6 hours.  You can also take flexeril (muscle relaxer) as prescribed for pain but do not drive/operate heavy machinery as it can make you drowsy    [facility] (Thursday afternoons) - call [phone] to schedule    You may call our referrals coordinator at [phone] Monday to Friday 11am-7pm for assistance with making an appointment    Radicular Pain  Back view of a person showing a normal leg and a leg affected by the pain of sciatica.   Radicular pain is a type of pain that spreads from your back or neck along a spinal nerve. Spinal nerves are nerves that leave the spinal cord and go to the muscles. Radicular pain is sometimes called radiculopathy, radiculitis, or a pinched nerve. When you have this type of pain, you may also have weakness, numbness, or tingling in the area of your body that is supplied by the nerve. The pain may feel sharp and burning. Depending on which spinal nerve is affected, the pain may occur in the:  Neck area (cervical radicular pain). You may also feel pain, numbness, weakness, or tingling in the arms.  Mid-spine area (thoracic radicular pain). You would feel this pain in the back and chest. This type is rare.  Lower back area (lumbar radicular pain). You would feel this pain as low back pain. You may feel pain, numbness, weakness, or tingling in the buttocks or legs. Sciatica is a type of lumbar radicular pain that shoots down the back of the leg.  Radicular pain occurs when one of the spinal nerves becomes irritated or squeezed (compressed). It is often caused by something pushing on a spinal nerve, such as one of the bones of the spine (vertebrae) or one of the round cushions between vertebrae (intervertebral disks). This can result from:  An injury.  Wear and tear or aging of a disk.  The growth of a bone spur that pushes on the nerve.  Radicular pain often goes away when you follow instructions from your health care provider for relieving pain at home.    How is this treated?  Treatment may depend on the cause of the condition and may include:  Working with a physical therapist.  Taking pain medicine.  Applying heat or ice or both to the affected areas.  Doing stretches to improve flexibility.  Having surgery. This may be needed if other treatments do not help. Different types of surgery may be done depending on the cause of this condition.  Follow these instructions at home:  Managing pain    Bag of ice on a towel on the skin.  A heating pad for use on the painful area.  If directed, put ice on the affected area. To do this:  Put ice in a plastic bag.  Place a towel between your skin and the bag.  Leave the ice on for 20 minutes, 2–3 times a day.  Remove the ice if your skin turns bright red. This is very important. If you cannot feel pain, heat, or cold, you have a greater risk of damage to the area.  If directed, apply heat to the affected area as often as told by your health care provider. Use the heat source that your health care provider recommends, such as a moist heat pack or a heating pad.  Place a towel between your skin and the heat source.  Leave the heat on for 20–30 minutes.  Remove the heat if your skin turns bright red. This is especially important if you are unable to feel pain, heat, or cold. You have a greater risk of getting burned.  Activity    Do not sit or rest in bed for long periods of time.  Try to stay as active as possible. Ask your health care provider what type of exercise or activity is best for you.  Avoid activities that make your pain worse, such as bending and lifting.  You may have to avoid lifting. Ask your health care provider how much you can safely lift.  Practice using proper technique when lifting items. Proper lifting technique involves bending your knees and rising up.  Do strength and range-of-motion exercises only as told by your health care provider or physical therapist.  General instructions    Take over-the-counter and prescription medicines only as told by your health care provider.  Pay attention to any changes in your symptoms.  Keep all follow-up visits. This is important.  Contact a health care provider if:  Your pain and other symptoms get worse.  Your pain medicine is not helping.  Your pain has not improved after a few weeks of home care.  You have a fever.  Get help right away if:  You have severe pain, weakness, or numbness.  You have difficulty with bladder or bowel control.  Summary  Radicular pain is a type of pain that spreads from your back or neck along a spinal nerve.  When you have radicular pain, you may also have weakness, numbness, or tingling in the area of your body that is supplied by the nerve.  The pain may feel sharp or burning.  Radicular pain may be treated with ice, heat, medicines, or physical therapy.  This information is not intended to replace advice given to you by your health care provider. Make sure you discuss any questions you have with your health care provider.

## 2023-12-31 NOTE — ED PROVIDER NOTE - OBJECTIVE STATEMENT
48 F p/w R arm pain and paresthesias since injury in Oct.  pt reports fall at work in Oct and landed on R arm and since has had pain in forearm radiating to shoulder and tingling in 2nd/3rd digits.  she sees physical pain med (takes ibuprofen and flexeril) and goes to PT for leg but pending appointment with orthopedic specialist in Feb.  denies f/c, skin changes, chest pain, sob, abd pain, nvd, limited ROM ext, joint pain, neck/back pain, other injuries

## 2023-12-31 NOTE — ED PROVIDER NOTE - PROVIDER TOKENS
PROVIDER:[TOKEN:[[id number]]],PROVIDER:[TOKEN:[[id number]]] PROVIDER:[TOKEN:[[id number]]],PROVIDER:[TOKEN:[[id number]]]

## 2023-12-31 NOTE — ED PROVIDER NOTE - CARE PROVIDERS DIRECT ADDRESSES
,DirectAddress_Unknown,[email or web address] ,DirectAddress_Unknown,[email or web address]

## 2023-12-31 NOTE — ED ADULT NURSE NOTE - CHIEF COMPLAINT QUOTE
Pt c/o right arm pain and right second and third finger numbness x 2 weeks. Pt endorses injury to right arm in November.

## 2024-01-02 DIAGNOSIS — M79.601 PAIN IN RIGHT ARM: ICD-10-CM

## 2024-01-02 DIAGNOSIS — R20.2 PARESTHESIA OF SKIN: ICD-10-CM

## 2024-01-08 ENCOUNTER — APPOINTMENT (OUTPATIENT)
Dept: ORTHOPEDIC SURGERY | Facility: CLINIC | Age: 49
End: 2024-01-08

## 2024-01-09 ENCOUNTER — NON-APPOINTMENT (OUTPATIENT)
Age: 49
End: 2024-01-09

## 2024-01-13 NOTE — HISTORY OF PRESENT ILLNESS
[de-identified] : JORGE LEDEZMA is a 48 year old female who presents with right shoulder pain. Patient is right-hand dominant. States the onset of pain was  No antecedent trauma or injury. Has not experienced previously. Pain is described as    in quality. Nonradiating. There is associated   There is no associated stiffness, numbness, paraesthesia or weakness. No associated neck pain. Exacerbating factors are lifting, dressing, lifting arms over head, grasping Alleviating factors include acetaminophen, NSAIDS, ice, heat, rest Exercises regularly.  Has not tried PT or OT. Employment: Lives with

## 2024-01-13 NOTE — ASSESSMENT
[FreeTextEntry1] : JORGE LEDEZMA is a 48 year old female with right shoulder pain. I discussed with the patient that their symptoms, signs, and imaging are most consistent with ***.  We reviewed the natural history of this condition and treatment options ranging from conservative measures (activity modification, physical therapy, icing, oral anti-inflammatory and/or analgesic medications, steroid injection) to surgical management.  We agreed on the following plan:   Activity modification Start Home Exercises for shoulder stretching and strengthening (Neer protocol). Demonstration, resistance band and handout provided.  Physical therapy. Referral provided. Medication: OTC or prescription provided. Advanced imaging: consider MRI if no symptomatic improvement. Follow up in 6-8 weeks. ]

## 2024-01-17 ENCOUNTER — APPOINTMENT (OUTPATIENT)
Dept: ORTHOPEDIC SURGERY | Facility: CLINIC | Age: 49
End: 2024-01-17

## 2024-11-20 ENCOUNTER — EMERGENCY (EMERGENCY)
Facility: HOSPITAL | Age: 49
LOS: 1 days | Discharge: HOME | End: 2024-11-20
Attending: EMERGENCY MEDICINE | Admitting: EMERGENCY MEDICINE
Payer: COMMERCIAL

## 2024-11-20 VITALS
OXYGEN SATURATION: 97 % | SYSTOLIC BLOOD PRESSURE: 172 MMHG | DIASTOLIC BLOOD PRESSURE: 87 MMHG | HEART RATE: 64 BPM | RESPIRATION RATE: 16 BRPM | TEMPERATURE: 98 F

## 2024-11-20 VITALS
HEART RATE: 70 BPM | SYSTOLIC BLOOD PRESSURE: 144 MMHG | DIASTOLIC BLOOD PRESSURE: 87 MMHG | RESPIRATION RATE: 17 BRPM | OXYGEN SATURATION: 96 %

## 2024-11-20 DIAGNOSIS — M54.2 CERVICALGIA: ICD-10-CM

## 2024-11-20 DIAGNOSIS — R51.9 HEADACHE, UNSPECIFIED: ICD-10-CM

## 2024-11-20 DIAGNOSIS — I10 ESSENTIAL (PRIMARY) HYPERTENSION: ICD-10-CM

## 2024-11-20 DIAGNOSIS — R00.1 BRADYCARDIA, UNSPECIFIED: ICD-10-CM

## 2024-11-20 PROCEDURE — 93010 ELECTROCARDIOGRAM REPORT: CPT

## 2024-11-20 PROCEDURE — 70450 CT HEAD/BRAIN W/O DYE: CPT | Mod: 26,MC

## 2024-11-20 PROCEDURE — 93005 ELECTROCARDIOGRAM TRACING: CPT

## 2024-11-20 PROCEDURE — 70450 CT HEAD/BRAIN W/O DYE: CPT | Mod: MC

## 2024-11-20 PROCEDURE — 99284 EMERGENCY DEPT VISIT MOD MDM: CPT | Mod: 25

## 2024-11-20 PROCEDURE — 99284 EMERGENCY DEPT VISIT MOD MDM: CPT

## 2024-11-20 RX ORDER — LIDOCAINE HYDROCHLORIDE 40 MG/ML
1 SOLUTION TOPICAL
Qty: 10 | Refills: 0
Start: 2024-11-20 | End: 2024-11-29

## 2024-11-20 RX ORDER — ALPRAZOLAM 0.25 MG
0.25 TABLET ORAL ONCE
Refills: 0 | Status: DISCONTINUED | OUTPATIENT
Start: 2024-11-20 | End: 2024-11-20

## 2024-11-20 RX ORDER — CYCLOBENZAPRINE HYDROCHLORIDE 30 MG/1
1 CAPSULE, EXTENDED RELEASE ORAL
Qty: 10 | Refills: 0
Start: 2024-11-20 | End: 2024-11-24

## 2024-11-20 RX ORDER — ACETAMINOPHEN 500 MG
1000 TABLET ORAL ONCE
Refills: 0 | Status: COMPLETED | OUTPATIENT
Start: 2024-11-20 | End: 2024-11-20

## 2024-11-20 RX ORDER — CYCLOBENZAPRINE HYDROCHLORIDE 30 MG/1
10 CAPSULE, EXTENDED RELEASE ORAL ONCE
Refills: 0 | Status: COMPLETED | OUTPATIENT
Start: 2024-11-20 | End: 2024-11-20

## 2024-11-20 RX ORDER — LIDOCAINE HYDROCHLORIDE 40 MG/ML
2 SOLUTION TOPICAL ONCE
Refills: 0 | Status: COMPLETED | OUTPATIENT
Start: 2024-11-20 | End: 2024-11-20

## 2024-11-20 RX ADMIN — LIDOCAINE HYDROCHLORIDE 2 PATCH: 40 SOLUTION TOPICAL at 04:41

## 2024-11-20 RX ADMIN — CYCLOBENZAPRINE HYDROCHLORIDE 10 MILLIGRAM(S): 30 CAPSULE, EXTENDED RELEASE ORAL at 04:42

## 2024-11-20 RX ADMIN — Medication 1000 MILLIGRAM(S): at 04:42

## 2024-11-20 NOTE — ED PROVIDER NOTE - OBJECTIVE STATEMENT
49 yr old female, denies medical hx, presents to the Emergency Department feeling anxious. 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 had another stressful interaction today and now for hours has been anxious, crying. feels overwhelmed. has mild headache, generalized, gradual onset after crying for a while. not worse of life. also has mild left neck discomfort. no si / hi. no hallucinations.    no infectious sx, cp, sob, palpitations, abd pain, vomiting, dizziness, extremity weakness / numbness, speech or gait changes.

## 2024-11-20 NOTE — ED PROVIDER NOTE - PROGRESS NOTE DETAILS
Problem: Adult Inpatient Plan of Care  Goal: Plan of Care Review  Outcome: Ongoing, Progressing  Flowsheets (Taken 5/3/2023 1415)  Progress: improving  Plan of Care Reviewed With: patient   Goal Outcome Evaluation:  Plan of Care Reviewed With: patient        Progress: improving       Pt pain controlled with scheduled pain medications. Chronic jimenes in place draining appropriately. ECHO has been ordered, still waiting on it to be done. Pt is now on room air. VSS. Anticipate discharge home tomorrow.       ct negative,  ecg sinus jerilyn   feeling better after meds in ED.   will dc w outpt follow up - given psych resources.     All results reviewed with the patient verbally. Discharge plan and return precautions d/w pt who verbalized understanding and agrees with plan. All questions answered. Vitals WNL. Ready for d/c.

## 2024-11-20 NOTE — ED ADULT TRIAGE NOTE - CHIEF COMPLAINT QUOTE
Pt. presents to the ED stating "I'm emotional."  Denies SI/HI. Pt. refusing to answer any further triage questions and states that she only wants to speak to the doctor.

## 2024-11-20 NOTE — ED PROVIDER NOTE - PATIENT PORTAL LINK FT
You can access the FollowMyHealth Patient Portal offered by [facility] by registering at the following website: [email or web address]. By joining [employer]’s FollowMyHealth portal, you will also be able to view your health information using other applications (apps) compatible with our system.

## 2024-11-20 NOTE — ED PROVIDER NOTE - NSFOLLOWUPINSTRUCTIONS_ED_ALL_ED_FT
Drink plenty of fluids. Get plenty of rest.   Take tylenol / motrin as needed for headache.  Take msucle relaxor (flexeril) as needed for neck pain.     Return to the Emergency Department for persistent, worsening or new symptoms including, sudden severe headache, change in vision, syncope or loss of consciousness, a facial droop, weakness of one side of your body or one of your extremities, uncontrollable nausea and vomiting, chest pain, shortness of breath, if you cannot keep down food/liquid, if your fever is very high and cannot be controlled by over the counter medication, if you pass out or lose consciousness, feel palpitations, chest pain, or shortness of breath, or if you have any other serious concerns.    __      If you decide to seek a consultation with a psychiatrist or therapist, you could do any of the followin) Go to your insurance company's website and search for an in-network psychiatrist. You should expect to call many providers before you will find someone who can see you, and then there will likely be a considerable wait time before your appointment.    2) Go to [employer] and search for a provider by insurance and zip code. You should expect to call many providers before you will find someone who can see you, and then there will likely be a considerable wait time before your appointment.    3) If you do not have insurance or to see a psychiatrist on the same day, you could go to a [facility] walk-in clinic, for example, to the one at [facility]:  [facility] Walk-in-clinic [address], at [address] Walk straight down the main [address] and take your first left.  Enter at room 1A2. 8:00am for Walk-in-Clinic [phone]    If you start to feel worse, especially if you are afraid you might hurt yourself or someone else, please call 911, return here, or go to your nearest emergency room.     0-[facility] ([phone]) is a free, confidential support, crisis intervention, and referral service for [facility] residents. You can call 24 hours per day/7 days per week. The hotline's staff of trained mental health professionals help callers find mental health and substance abuse services.      ______________________    Other walk-in clinics in [facility]:  1) [facility]  [address] [place] [address]  [phone]; Mon-Fri 8:30am-8pm Sat-Sun 9am-5pm    2) [facility] [address]   [address] [address]  [phone], [phone]; Mon-Sun 8am-8pm    3) [facility]: Adult Outpatient Psychiatry  [address], [address]st floor [[address]]  [place] [address]  [phone]; walk-in hours Mon-Fri 7:45am-8:30am    4) [facility]: Behavioral Health Center/Walk-In Clinic   [address] [address]  [phone], [phone]; walk-in hours Mon-Fri 8:30am-10:30am    5) [facility]: Adult Outpatient Mental Health Clinic  [address], [address] [address]  [phone], intake [phone]; Mon-Fri 9am-5pm    6) [facility]: Adult Outpatient Clinic  [address] 4th [place] [address]  [phone]; Mon-Fri 9am-5pm

## 2024-11-20 NOTE — ED PROVIDER NOTE - CLINICAL SUMMARY MEDICAL DECISION MAKING FREE TEXT BOX
had another stressful interaction today and now for hours has been anxious, crying. feels overwhelmed. has mild headache, generalized, gradual onset after crying for a while. not worse of life. also has mild left neck discomfort. no si / hi. no hallucinations.    pt well appearing, stable vitals, exam w L paraspinal tenderness cervical region, otherwise exam unremarkable - neuro vascularly intact.    initially planned for ecg and trial of ativan but pt subsequently changed her mind.   after discussion - will get ct head r/o ich or mass given headache and pt denies frequent headaches. no red flag symptoms.   will also give lidocaine patch and muscle relaxor for neck discomfort - suspect msk pain, muscle spasm, no concern for cord compression. neuro intact.   no si / hi / hallucinations. no acutely psychotic. do not feel need for emergent psych eval.  will reassess after imaging and meds

## 2024-11-20 NOTE — ED PROVIDER NOTE - PHYSICAL EXAMINATION
Constitutional : anxious, tearful.  awake, alert, oriented to person, place, time/situation.  Head : head normocephalic, atraumatic  EENMT : eyes clear bilaterally, PERRL, EOMI. airway patent. moist mucous membranes. neck supple.  Cardiac : Normal rate, regular rhythm. No murmur appreciated, no LE edema.  Resp : Breath sounds clear and equal bilaterally. Respirations even and unlabored.   Gastro : abdomen soft, nontender, nondistended. no rebound or guarding. no CVAT.  MSK :  range of motion is not limited, no muscle or joint tenderness  Back : No evidence of trauma. L cervical paraspinal tenderness, no midline tenderness. no T/L tenderness.   Vasc : Extremities warm and well perfused. 2+ radial and DP pulses. cap refill <2 seconds  Neuro : Alert and oriented, CNII-XII grossly intact, no motor or sensory deficits.  Skin : Skin normal color for race, warm, dry and intact. .  Psych : Alert and oriented to person, place, time/situation. anxious, tearful. calm / cooperative. denies si.

## 2024-11-20 NOTE — ED ADULT NURSE NOTE - OBJECTIVE STATEMENT
Patient is a 49yoF presenting to the ED for emotional distress. Pt is aoxx3, spont breathing on RA. Pt states that she has been feeling overwhelmed all night long as she had a "stressful encounter with her direct supervisor." Patient is a 49yoF presenting to the ED for emotional distress. Pt is aoxx3, spont breathing on RA. Pt states that she has been feeling overwhelmed all night long as she had a "stressful encounter with her direct supervisor." Pt denies chest pain/sob, reports that she has been emotional and crying for a while, endorsing headaches and neck pain, denies dizziness.